# Patient Record
Sex: MALE | Race: WHITE
[De-identification: names, ages, dates, MRNs, and addresses within clinical notes are randomized per-mention and may not be internally consistent; named-entity substitution may affect disease eponyms.]

---

## 2018-07-30 ENCOUNTER — HOSPITAL ENCOUNTER (OUTPATIENT)
Dept: HOSPITAL 68 - ERH | Age: 38
Setting detail: OBSERVATION
LOS: 2 days | End: 2018-08-01
Attending: INTERNAL MEDICINE
Payer: COMMERCIAL

## 2018-07-30 VITALS — BODY MASS INDEX: 42.23 KG/M2 | HEIGHT: 70 IN | WEIGHT: 295 LBS

## 2018-07-30 DIAGNOSIS — F11.20: ICD-10-CM

## 2018-07-30 DIAGNOSIS — G47.33: ICD-10-CM

## 2018-07-30 DIAGNOSIS — I47.1: Primary | ICD-10-CM

## 2018-07-30 DIAGNOSIS — Z79.82: ICD-10-CM

## 2018-07-30 DIAGNOSIS — E66.01: ICD-10-CM

## 2018-07-30 LAB
ABSOLUTE GRANULOCYTE CT: 5 /CUMM (ref 1.4–6.5)
APTT BLD: 28 SEC (ref 25–37)
BASOPHILS # BLD: 0.2 /CUMM (ref 0–0.2)
BASOPHILS NFR BLD: 1.9 % (ref 0–2)
EOSINOPHIL # BLD: 0.3 /CUMM (ref 0–0.7)
EOSINOPHIL NFR BLD: 2.8 % (ref 0–5)
ERYTHROCYTE [DISTWIDTH] IN BLOOD BY AUTOMATED COUNT: 15.5 % (ref 11.5–14.5)
GRANULOCYTES NFR BLD: 55.4 % (ref 42.2–75.2)
HCT VFR BLD CALC: 40.6 % (ref 42–52)
LYMPHOCYTES # BLD: 3.2 /CUMM (ref 1.2–3.4)
MCH RBC QN AUTO: 27.6 PG (ref 27–31)
MCHC RBC AUTO-ENTMCNC: 33.3 G/DL (ref 33–37)
MCV RBC AUTO: 82.9 FL (ref 80–94)
MONOCYTES # BLD: 0.4 /CUMM (ref 0.1–0.6)
PLATELET # BLD: 343 /CUMM (ref 130–400)
PMV BLD AUTO: 8.3 FL (ref 7.4–10.4)
PROTHROMBIN TIME: 10.7 SEC (ref 9.4–12.5)
RED BLOOD CELL CT: 4.9 /CUMM (ref 4.7–6.1)
WBC # BLD AUTO: 9.1 /CUMM (ref 4.8–10.8)

## 2018-07-30 PROCEDURE — G0378 HOSPITAL OBSERVATION PER HR: HCPCS

## 2018-07-30 NOTE — RADIOLOGY REPORT
EXAMINATION:
XR CHEST
 
CLINICAL INFORMATION:
Chest pain
.
COMPARISON:
None
 
TECHNIQUE:
2 views of the chest were obtained.
 
FINDINGS:
No focal consolidation, pleural effusion or pneumothorax.
 
Heart size is normal.
 
No acute osseous abnormality.
 
IMPRESSION:
No acute cardiopulmonary process.

## 2018-07-31 ENCOUNTER — HOSPITAL ENCOUNTER (OUTPATIENT)
Dept: HOSPITAL 68 - ERH | Age: 38
Setting detail: OBSERVATION
LOS: 1 days | End: 2018-08-01
Attending: INTERNAL MEDICINE
Payer: COMMERCIAL

## 2018-07-31 VITALS — SYSTOLIC BLOOD PRESSURE: 112 MMHG | DIASTOLIC BLOOD PRESSURE: 64 MMHG

## 2018-07-31 VITALS — HEIGHT: 72 IN | WEIGHT: 295 LBS | BODY MASS INDEX: 39.96 KG/M2

## 2018-07-31 DIAGNOSIS — E66.01: ICD-10-CM

## 2018-07-31 DIAGNOSIS — G47.33: ICD-10-CM

## 2018-07-31 DIAGNOSIS — F11.21: ICD-10-CM

## 2018-07-31 DIAGNOSIS — I47.2: Primary | ICD-10-CM

## 2018-07-31 LAB
ABSOLUTE GRANULOCYTE CT: 4.4 /CUMM (ref 1.4–6.5)
ABSOLUTE GRANULOCYTE CT: 6 /CUMM (ref 1.4–6.5)
BASOPHILS # BLD: 0.1 /CUMM (ref 0–0.2)
BASOPHILS # BLD: 0.1 /CUMM (ref 0–0.2)
BASOPHILS NFR BLD: 0.6 % (ref 0–2)
BASOPHILS NFR BLD: 0.8 % (ref 0–2)
EOSINOPHIL # BLD: 0.2 /CUMM (ref 0–0.7)
EOSINOPHIL # BLD: 0.3 /CUMM (ref 0–0.7)
EOSINOPHIL NFR BLD: 2.6 % (ref 0–5)
EOSINOPHIL NFR BLD: 2.7 % (ref 0–5)
ERYTHROCYTE [DISTWIDTH] IN BLOOD BY AUTOMATED COUNT: 15.4 % (ref 11.5–14.5)
ERYTHROCYTE [DISTWIDTH] IN BLOOD BY AUTOMATED COUNT: 15.7 % (ref 11.5–14.5)
GRANULOCYTES NFR BLD: 52.8 % (ref 42.2–75.2)
GRANULOCYTES NFR BLD: 62.2 % (ref 42.2–75.2)
HCT VFR BLD CALC: 37.8 % (ref 42–52)
HCT VFR BLD CALC: 38.5 % (ref 42–52)
LYMPHOCYTES # BLD: 2.8 /CUMM (ref 1.2–3.4)
LYMPHOCYTES # BLD: 3.2 /CUMM (ref 1.2–3.4)
MCH RBC QN AUTO: 27.6 PG (ref 27–31)
MCH RBC QN AUTO: 27.7 PG (ref 27–31)
MCHC RBC AUTO-ENTMCNC: 33 G/DL (ref 33–37)
MCHC RBC AUTO-ENTMCNC: 33.3 G/DL (ref 33–37)
MCV RBC AUTO: 83.1 FL (ref 80–94)
MCV RBC AUTO: 83.8 FL (ref 80–94)
MONOCYTES # BLD: 0.4 /CUMM (ref 0.1–0.6)
MONOCYTES # BLD: 0.5 /CUMM (ref 0.1–0.6)
PLATELET # BLD: 311 /CUMM (ref 130–400)
PLATELET # BLD: 362 /CUMM (ref 130–400)
PMV BLD AUTO: 7.2 FL (ref 7.4–10.4)
PMV BLD AUTO: 7.5 FL (ref 7.4–10.4)
RED BLOOD CELL CT: 4.52 /CUMM (ref 4.7–6.1)
RED BLOOD CELL CT: 4.63 /CUMM (ref 4.7–6.1)
WBC # BLD AUTO: 8.4 /CUMM (ref 4.8–10.8)
WBC # BLD AUTO: 9.6 /CUMM (ref 4.8–10.8)

## 2018-07-31 PROCEDURE — G0378 HOSPITAL OBSERVATION PER HR: HCPCS

## 2018-07-31 NOTE — HISTORY & PHYSICAL
You RODRIGUEZDaniel 07/31/18 0213:
General Information and HPI
History of Present Illness:
37-year-old man with past medical history of opiate abuse on suboxone seen for 
evaluation of palpitations.
 
Patient reports over the past several months he has experienced brief (<10 
second) episodes of dizziness, cold sweats, shortness of breath, nausea, and 
palpitations. He saw his PCP recently about this whom ordered a Holter monitor 
that he wore last week (Thursday into Friday). Today he reported an episode that
last 30 minutes for which he came to the [place] ED for evaluation. Upon arrival
he was reportedly seen to be in a normal sinus rhythm but was given 5 mg of IV 
metoprolol with dramatic improve in his symptoms. He was apparently seen to be 
"in SVT" on telemetry per the ED provider. Currently he feels well and has no 
complaints
 
Review of Systems
He otherwise denies any headache, fever, chills, vision change, lightheadedness,
dizziness, chest pain, current palpitations, shortness of breath, nausea, 
vomiting, diarrhea, abdominal pain.
 
Objective
Vitals: Temp 96.8-97.5, HR , RR 17-18, -142/59-80
Physical Exam
-General: well developed, morbidly obese young  man in no acute 
distress
-HEENT: NCAT, PERRL, EOMI, anicteric sclera
-Neck: Supple, no JVD
-Cardio: Normal S1/S2 w/o m/g/r; RRR
-Pulmonary: CTA bilaterally
-Abdomen: Soft, NT, ND, BS+
-Neuro: Awake and alert, CN II-XII grossly intact
-Extremities: normal pulses, no edema
 
Labs / Imaging / Studies
-CBC: Wbc 9.1, Hgb 13.5, Hct 40.6, Plt 343
-BMP: Na 139, K 4.1, Cl 102, CO2 25, BUN 19, Cr 0.7, AG 12, Glu 95
-LFT: AST 73, ALT 87
-Misc: Mg 1.8, DD unremarkable, troponin I <0.01, TSH 2.73, T4 1.35, INR 0.98
-UTox: negative
-EKG: Sinus tachycardia
-CXR: No acute cardiopulmonary process.
 
Assessment
37 year old man seen for evaluation of palpitations. Patient currently feels 
well and has no complaints. Vitals are significant for elevated heart rate on 
telemetry up to 150s per ED staff. Physical examination includes a normal 
cardiopulmonary, abdominal, and neurologic exam. Labs including CBC, BMP, 
troponin, D-dimer, TFTs, and utox are within normal limits or negative. EKG is 
sinus tachycardia.
 
Clinically patient appears to have had some episode of palpitations with 
reported telemetry evidence of an SVT; this however is not available for review 
as the data was "purged". His recent holter mnitor results have no yet been 
reviewed. Patient is being placed under observation on the telemetry floor for 
telemetry monitoring, rate control, echocardiogram, and cardiology consultation.
 
Problem List
-Symptomatic palpitations, possible underlying SVT
-Morbid Obesity
-History of opiate abuse, on suboxone
 
Plan
-Place under observation on telemetry
-Telemetry monitoring
-Start Metoprolol 25 mg PO BID
-Continue suboxone
-Consult with cardiology for palpitations
-Transthoracic echocardiogram
-Pain control with acetaminophen
-Regular diet
-DVT PPx with lovenox
-FULL CODE
 
Allergies/Medications
Allergies:
Coded Allergies:
No Known Allergies (07/30/18)
 
 
Past History
 
Travel History
Traveled to [place] past 21 day No
 
Surgical History
Surgical History: non-contributory
 
Review of Systems
 
Review of Systems
Constitutional:
Reports: see HPI. 
 
Exam & Diagnostic Data
Last 24 Hrs of Vital Signs/I&O
 Vital Signs
 
 
Date Time Temp Pulse Resp B/P B/P Pulse O2 O2 Flow FiO2
 
     Mean Ox Delivery Rate 
 
07/31 0847 98.2 87 18 112/64     
 
07/31 0846 98.2 87 18 112/64  98   
 
07/31 0735 97.4 60 18 110/68  96 Room Air  
 
07/31 0612 96.4 60 16 105/61  93 Room Air  
 
07/30 2300 97.5 64 18 154/80  95 Room Air  
 
07/30 2104 97.2 71  113/70     
 
07/30 2024  72 17 100/59  95 Room Air  
 
07/30 2013  92       
 
07/30 2005 96.8        
 
07/30 1954  100 18 142/74  97 Room Air  
 
 
 Intake & Output
 
 
 07/31 1600 07/31 0800 07/31 0000
 
Intake Total   
 
Output Total   
 
Balance   
 
    
 
Patient   133.81 kg
 
Weight   
 
Weight   Reported by Patient
 
Measurement   
 
Method   
 
 
 
 
Assessment/Plan
 
As Ranked By This Provider
Problem List:
 1. SVT (supraventricular tachycardia)
 
 
Core Measures/Misc (9/17)
 
Acute Coronary Syndrome
ACS Diagnosis: No
 
Congestive Heart Failure
Congestive Heart Failure Diagnosis No
 
Cerebrovascular Accident
CVA/TIA Diagnosis: No
 
VTE (View Protocol)
VTE Risk Factors No risk factors
No Mechanical VTE Prophylaxis d/t N/A MechProphylax Ordered
No VTE Pharm Prophylaxis d/t NA PharmProphylax ordered
 
Sepsis (View protocol)
Sepsis Present: No
If YES complete Sepsis Event Note If YES complete Sepsis Event Note
 
Laura Zhu MD 07/31/18 2100:
General Information and HPI
 
Allergies/Medications
Home Med list
Aspirin (Ecotrin*) 81 MG TABLET.DR   1 TAB PO DAILY chest pain
Buprenorphine HCl/Naloxone HCl (Suboxone 8 MG-2 MG Sl Film) 8 MG-2 MG FILM   1 
STR SL BID OPIATE DEPENDENCE  (Reported)
Metoprolol Tartrate (Lopressor) 50 MG TABLET   0.5 TAB PO BID svt
 
 
 
Core Measures/Misc (9/17)
 
Sepsis (View protocol)
If YES complete Sepsis Event Note If YES complete Sepsis Event Note
 
Attending MD Review Statement
 
Attending Statement
Attending MD Statement: examined this patient, discuss w/resident/PA/NP, agreed 
w/resident/PA/NP
Attending Assessment/Plan:
This is a 37-year-old male admitted to the hospital for evaluation of dizziness 
and palpitations incidentally found to have SVT while in the emergency 
department.  He spontaneously broke out of the rhythm and was started on 
metoprolol 25 mg p.o. twice daily.  Patient will be seen by cardiology in the 
morning.  We will obtain an echocardiogram and he will need outpatient follow-up
with EP.

## 2018-07-31 NOTE — PATIENT DISCHARGE INSTRUCTIONS
Discharge Instructions
 
General Discharge Information
You were seen/treated for:
SVT
DIZZINESS
Special Instructions:
F/U PCP IN ONE WEEK AFTER DISCHARGE
 
F/U CARDIOLOGIST IN ONE WEEK AFTER DISCHARGE
 
Diet
Continue normal diet: Yes
 
Activity
Full Activity/No Limits: Yes
 
Acute Coronary Syndrome
 
Inclusion Criteria
At DC or during hospital stay patient has or had the following:
ACS DIAGNOSIS No
 
Discharge Core Measures
Meds if any: Prescribed or Continued at Discharge
Meds if any: NOT Prescribed or Continued at Discharge
 
Congestive Heart Failure
 
Inclusion Criteria
At DC or during hospital stay patient has or had the following:
CHF DIAGNOSIS No
 
Discharge Core Measures
Meds if any: Prescribed or Continued at Discharge
Meds if any: NOT Prescribed or Continued at Discharge
 
Cerebrovascular accident
 
Inclusion Criteria
At DC or during hospital stay patient has or had the following:
CVA/TIA Diagnosis No
 
Discharge Core Measures
Meds if any: Prescribed or Continued at Discharge
Meds if any: NOT Prescribed or Continued at Discharge
 
Venous thromboembolism
 
Inclusion Criteria
VTE Diagnosis No
VTE Type NONE
VTE Confirmed by (Test) NONE
 
Discharge Core Measures
- Per Current guidelines, there needs to be overlap
- treatment for the first 5 days of Warfarin therapy.
- If discharged on Warfarin prior to 5 days of
- overlap therapy, the patient will need to be
- assessed for post discharge needs including
- *Post discharge parental anticoagulation
- *Warfarin and/or parental anticoagulation education
- *Follow up date to check INR post discharge
At least 5 days overlap therapy as Inpatient No
Meds if any: Prescribed or Continued at Discharge
Note: Overlap Therapy is Warfarin and Anticoagulant
Meds if any: NOT Prescribed or Continued at Discharge

## 2018-07-31 NOTE — PN- ATT ADDEND
Attending Addendum
Attending Brief Note
Patient seen and examined.  37-year-old male past medical history of chronic 
opiate dependence on Suboxone who is here with symptomatic SVT.  His SVT broke 
and patient has been in normal sinus rhythm since then.  Patient has clearly had
this episode before and had a recent Holter monitor done with the [facility] 
cardiologist.  For now we will watch him off medication.  I spoke to Dr. Barcenas
who will come and see him and likely start him on a calcium channel blocker.  If
cleared by Dr. Barcenas, I anticipate discharge later today with close outpatient
follow-up.

## 2018-07-31 NOTE — EVENT NOTE
Event Note
Event Note:
The patient was seen this morning.  He is doing better.  Patient gives a history
of relief of symptoms post metoprolol 25 mg treatment and aspirin 81 mg. amd 
follow up with Dr Barcenas out patient for recurrent symptoms

## 2018-07-31 NOTE — CONS- CARDIOLOGY
General Information and HPI
 
Consulting Request
Date of Consult: 18
Requested By:
Twyla RODRIGUEZ,Karon CABRERA
 
History of Present Illness:
Mr. Rangel is a 37 year old male with history of obesity, obstuctive sleep apnea
and drug dependence. Over the past year he has noted intermittent palpitations 
that typically last for just seconds at a time. Over the past couple months 
these palpitations have become more frequent and typically happen a couple times
per day. Due to a prolonged episode today, this patient opted to be evaluated in
the ER. He was reportedly tachycardic but no recording was obtained. The 
palpitations are described as a rapid heart beat that does not cause any neck 
pulsation but is associated with lightheadedness and shortness of breath and on 
very rare occasions nausea. He also notes a chest tightness in association with 
any significant episode. At his baseline, he can exercise at a gym without 
symptoms. In the ER, this patient was started on Metoprolol and he currently 
feels back to normal. He is noted to have mildly increased hepatic transaminases
but otherwise has normal thyroid function tests. It should be noted that this 
patient had a recent Holter monitor done with results pending.
 
Allergies/Medications
Allergies:
Coded Allergies:
No Known Allergies (18)
 
Home Med List:
Buprenorphine HCl/Naloxone HCl (Suboxone 8 MG-2 MG Sl Film) 8 MG-2 MG FILM   1 
STR SL BID OPIATE DEPENDENCE  (Reported)
 
 
Review of Systems
Review of Systems:
A twelve point review of systems is unremarkable.
 
Past History
 
Travel History
Traveled to [place] past 21 day No
 
Medical History
EENT: NONE
Respiratory: obstructive sleep apnea (on CPAP)
Gastrointestinal: NONE
Hepatic: NONE
Renal: NONE
Musculoskeletal: NONE
Psychiatric: NONE
Endocrine: NONE
Blood Disorders: NONE
Cancer(s): NONE
GYN/Reproductive: NONE
 
Surgical History
Surgical History: right knee surgery x 2
 
Family History
Relations & Conditions If Any:
FATHER ( young of unclear cause).
 
 
Psychosocial History
Where Do You Live? Home
Services at Home: None
Smoking Status: Unknown If Ever Smoked
 
Exam & Diagnostic Data
Vital Signs and I&O
Vital Signs
 
 
Date Time Temp Pulse Resp B/P B/P Pulse O2 O2 Flow FiO2
 
     Mean Ox Delivery Rate 
 
 1058 98.2 52 18 112/64     
 
 0847 98.2 87 18 112/64     
 
 0846 98.2 87 18 112/64  98   
 
 0735 97.4 60 18 110/68  96 Room Air  
 
 0612 96.4 60 16 105/61  93 Room Air  
 
 2300 97.5 64 18 154/80  95 Room Air  
 
 2104 97.2 71  113/70     
 
 2024  72 17 100/59  95 Room Air  
 
 2013  92       
 
 2005 96.8        
 
 1954  100 18 142/74  97 Room Air  
 
 
 Intake & Output
 
 
  08 0000  08 0000
 
Intake Total 200     
 
Output Total      
 
Balance 200     
 
       
 
Intake, Oral 200     
 
Patient 295 lb  295 lb   
 
Weight      
 
Weight   Reported by Patient   
 
Measurement      
 
Method      
 
 
 
Physical Exam:
General: WD/obese male in NAD; alert and oriented x 3
HEENT: NC/AT, PERRL, EOMI
Neck: no JVD, no carotid bruit
Heart: RRR w/o murmur
Lungs: clear bilaterally
Abdomen: soft, obese, NT, +ve bowel sounds
Extremities: no edema
 
Assessment/Plan
Assessment/Plan
* This patient has recurrent episodes of palpitations with increased heart rate.
In the absence of an ECG or telemetry strip it is difficult to know if this is 
atrial fibrillation, flutter with 2:1 block or any of a number of other 
supraventricular dysrhythmias. Of some concern are his symptoms of chest 
discomfort in association with the palpitations. I do not think this patient is 
having unstable angina and he has ruled out for an MI and therefore additional 
evaluation may be done as an outpatient.
* We will plan on obtaining his Holter monitor results and will arrange for this
patient to have an outpatient stress test to assess for myocardial ischemia. We 
will also obtain an echocardiogram.
* For now, I would continue Metoprolol at 25mg BID. It is now clear that this is
an SVT verses atrial fib/flutter and rapid heart rates may be giving him 
ischemic symptoms. As such, a beta blocker is reasonable for now. Begin an 
aspirin at 81mg daily.
 
 
Consult Acknowledgment
- Thank you for your consult request.

## 2018-07-31 NOTE — ED GENERAL ADULT
History of Present Illness
 
General
Chief Complaint: General Adult
Stated Complaint: SIB PCP FOR RE-ADMISSION
Source: patient
Exam Limitations: no limitations
 
Vital Signs & Intake/Output
Vital Signs & Intake/Output
 Vital Signs
 
 
Date Time Temp Pulse Resp B/P B/P Pulse O2 O2 Flow FiO2
 
     Mean Ox Delivery Rate 
 
 2249 97.7 65 20 95/50  94 Room Air  
 
 2130 97.8 58 20 102/54  94 Room Air  
 
 1928 98.1 59 18 103/67  95 Room Air  
 
 1748 97.5 58 22 99/56  94 Room Air  
 
 
 ED Intake and Output
 
 
  0000  1200
 
Intake Total 0 
 
Output Total  
 
Balance 0 
 
   
 
Intake, Oral 0 
 
Patient 295 lb 
 
Weight  
 
 
 
Allergies
Coded Allergies:
No Known Allergies (18)
 
Reconcile Medications
Aspirin (Ecotrin*) 81 MG TABLET.DR   1 TAB PO DAILY chest pain
Buprenorphine HCl/Naloxone HCl (Suboxone 8 MG-2 MG Sl Film) 8 MG-2 MG FILM   1 
STR SL BID OPIATE DEPENDENCE  (Reported)
Metoprolol Tartrate (Lopressor) 50 MG TABLET   0.5 TAB PO BID svt
 
Triage Note:
PER PT SEEN LAST NIGHT FOR SVT, WAS SUPPOSED TO
STAY BUT HAD A HOLTER MONITOR AND IT SHOWED MORE
TOLD TO RETURN FOR ADMISSION
Triage Nurses Notes Reviewed? yes
Onset: Gradual
Duration: day(s):
Timing: intermittent
HPI:
38 y/o male with a h/o GIOVANNY presenting with intermittent episodes of palpitations
, SOB, nausea, diaphoresis x1 month. Reports the episodes will last a few 
seconds, usually ~10 secs, but have been increasing in frequency, now occuring 5
-6 times per day. Was seen by his PMD last week and placed on Holter monitor and
has been awaiting the results. States last night had an episode that lasted ~40 
mins and presented to the ED. during his emergency department stay he was noted 
to have an episode of SVT that had self resolved.  Was discharged ~2 hours ago 
with ASA and metoprolol. Was called by his PMD ~1 hr ago to return to the ED for
admission as the results of his Holter monitor had been showing sustained runs 
of V. tach.  On arrival to the emergency Department patient is currently 
asymptomatic and experiencing no chest pain or palpitations.
(Kirstie WARE,Clara)
 
Past History
 
Travel History
Traveled to [place] past 21 day No
 
Medical History
Any Pertinent Medical History? see below for history
Neurological: NONE
EENT: NONE
Respiratory: obstructive sleep apnea (on CPAP)
Gastrointestinal: NONE
Hepatic: NONE
Renal: NONE
Musculoskeletal: NONE
Psychiatric: NONE
Endocrine: NONE
Blood Disorders: NONE
Cancer(s): NONE
GYN/Reproductive: NONE
 
Surgical History
Surgical History: right knee surgery x 2
 
Psychosocial History
Who do you live with Spouse
Services at Home None
What is your primary language English
Tobacco Use: Current Not Daily
Daily Tobacco Use Amount/Type: =< 4 Cigarettes daily
 
Family History
Family History, If Any:
FATHER ( young of unclear cause).
 
Hx Contributory? No
(Clara Wadsworth)
 
Review of Systems
 
Review of Systems
Constitutional:
Reports: no symptoms. 
EENTM:
Reports: no symptoms. 
Respiratory:
Reports: see HPI. 
Cardiovascular:
Reports: see HPI. 
GI:
Reports: no symptoms. 
Genitourinary:
Reports: no symptoms. 
Musculoskeletal:
Reports: no symptoms. 
Skin:
Reports: no symptoms. 
Neurological/Psychological:
Reports: see HPI. 
Hematologic/Endocrine:
Reports: no symptoms. 
Immunologic/Allergic:
Reports: no symptoms. 
All Other Systems: Reviewed and Negative
(Clara Wadsworth)
 
Physical Exam
 
Physical Exam
General Appearance: well developed/nourished, no apparent distress, alert, awake
, comfortable
Head: atraumatic, normal appearance
Eyes:
Bilateral: normal appearance. 
Neck: normal inspection
Respiratory: normal breath sounds, lungs clear
Cardiovascular: regular rate/rhythm
Gastrointestinal: soft, non-tender
Back: normal inspection
Extremities: normal inspection
Neurologic/Psych: awake, alert, oriented x 3, normal gait, normal mood/affect
Skin: intact, normal color, warm/dry
 
Core Measures
ACS in differential dx? No
CVA/TIA Diagnosis: No
Sepsis Present: No
Sepsis Focused Exam Completed? No
(Clara Wadsworth)
 
Progress
Differential Diagnoses
I considered the following diagnoses in my evaluation of the patient: [Cardiac 
arrhythmia versus electrolyte derangement versus ACS versus thyroid imbalance]
 
Plan of Care:
 Orders
 
 
Procedure Date/time Status
 
Regular Diet  B Active
 
Patient Data 2327 Active
 
Place in observation 2325 Active
 
ED Holding Orders 2325 Active
 
Vital Signs 2325 Active
 
Code Status 2325 Active
 
THYROID STIMULATING HORMONE  174 Complete
 
TROPONIN LEVEL  174 Complete
 
MAGNESIUM  1746 Complete
 
FREE T4  174 Complete
 
COMPREHENSIVE METABOLIC PANEL  174 Complete
 
CBC WITHOUT DIFFERENTIAL 1746 Complete
 
EKG 1746 Active
 
 
 Laboratory Tests
 
 
 
18 1753:
Anion Gap 13, Estimated GFR > 60, BUN/Creatinine Ratio 22.5, Glucose 107  H, 
Calcium 9.3, Magnesium 1.9, Total Bilirubin 0.3, AST 42, ALT 77  H, Alkaline 
Phosphatase 79, Troponin I < 0.01, Total Protein 7.0, Albumin 4.0, Globulin 3.0,
Albumin/Globulin Ratio 1.3, TSH 0.638, Free T4 1.11, CBC w Diff NO MAN DIFF REQ,
RBC 4.63  L, MCV 83.1, MCH 27.7, MCHC 33.3, RDW 15.4  H, MPV 7.5, Gran % 62.2, 
Lymphocytes % 29.0, Monocytes % 5.5, Eosinophils % 2.7, Basophils % 0.6, 
Absolute Granulocytes 6.0, Absolute Lymphocytes 2.8, Absolute Monocytes 0.5, 
Absolute Eosinophils 0.3, Absolute Basophils 0.1
 
EKG shows normal sinus rhythm, no cardiac arrhythmias
Labs unremarkable
Discussed with Dr. Barcenas and will place in observation to telemetry monitoring
Discussed with EDMD
Initial ED EKG: NSR, no ST T wave changes
(Clara Wadsworth)
 
Departure
 
Departure
Disposition: STILL A PATIENT
Condition: Stable
Clinical Impression
Primary Impression: Palpitations
Secondary Impressions: Cardiac arrhythmia
Referrals:
Brant Benavides MD, III (PCP/Family)
 
Departure Forms:
Customer Survey
General Discharge Information
 
Observation Note
Place Patient In: Non-ED OBS Care Area
Rationale for Observation:
My rational for observation is as follows [serial EKGs, serial troponins, 
telemetry monitoring, hemodynamic monitoring, cardiology consult].
 
(Clara Wadsworth)
 
Observation Note
Spoke With:
Audra RODRIGUEZ,Laura
Physician Advisor Notified: KAUSHAL ANDERSON DO
 
PA/NP Co-Sign Statement
Statement:
ED Attending supervision documentation-
 
[X] I saw and evaluated the patient. I have also reviewed all the pertinent lab 
results and diagnostic results. I agree with the findings and the plan of care 
as documented in the PA's/NP's documentation. 
 
[] I have reviewed the ED Record and agree with the PA's/NP's documentation.
 
[] Additions or exceptions (if any) to the PAs/NP's note and plan are 
summarized below:
[]
 
Patient in no acute distress on my exam.  He is been having runs of V. tach, and
concern was raised for progression to V. fib.
(Kaushal Anderson DO)
 
Critical Care Note
 
Critical Care Note
Critical Care Time: non-applicable
(Clara Wadsworth)

## 2018-08-01 VITALS — SYSTOLIC BLOOD PRESSURE: 98 MMHG | DIASTOLIC BLOOD PRESSURE: 62 MMHG

## 2018-08-01 VITALS — SYSTOLIC BLOOD PRESSURE: 102 MMHG | DIASTOLIC BLOOD PRESSURE: 64 MMHG

## 2018-08-01 VITALS — DIASTOLIC BLOOD PRESSURE: 62 MMHG | SYSTOLIC BLOOD PRESSURE: 106 MMHG

## 2018-08-01 LAB
ABSOLUTE GRANULOCYTE CT: 4.3 /CUMM (ref 1.4–6.5)
BASOPHILS # BLD: 0 /CUMM (ref 0–0.2)
BASOPHILS NFR BLD: 0.5 % (ref 0–2)
EOSINOPHIL # BLD: 0.3 /CUMM (ref 0–0.7)
EOSINOPHIL NFR BLD: 3.2 % (ref 0–5)
ERYTHROCYTE [DISTWIDTH] IN BLOOD BY AUTOMATED COUNT: 15.6 % (ref 11.5–14.5)
GRANULOCYTES NFR BLD: 52.7 % (ref 42.2–75.2)
HCT VFR BLD CALC: 37.1 % (ref 42–52)
LYMPHOCYTES # BLD: 3.2 /CUMM (ref 1.2–3.4)
MCH RBC QN AUTO: 27.6 PG (ref 27–31)
MCHC RBC AUTO-ENTMCNC: 32.8 G/DL (ref 33–37)
MCV RBC AUTO: 84.1 FL (ref 80–94)
MONOCYTES # BLD: 0.4 /CUMM (ref 0.1–0.6)
PLATELET # BLD: 317 /CUMM (ref 130–400)
PMV BLD AUTO: 7.7 FL (ref 7.4–10.4)
RED BLOOD CELL CT: 4.41 /CUMM (ref 4.7–6.1)
WBC # BLD AUTO: 8.2 /CUMM (ref 4.8–10.8)

## 2018-08-01 NOTE — PN-OBSERVATION
Observation Note
 
Observation Note
_
I have personally examined IRMA GILMORE. him disposition is uncertain at this 
time. Before a determination can be made, he requires continued observation for 
the following reasons [].
 
 
Assessment/Plan Medical
Assessment:
 Mr Gilmore is a 37 M patient on suboxone therapy for past 9 years, came to ER 
with a h/o racing of heart and uneasiness on July 31, was suspected of SVT , and
was observed and discharged on metoprolol 25mg bid.. His previous Holter monitor
showed presence of ventricular tachycardia, hence he was brought back to the ER 
and was admitted in telemetry for observation.
 
Problem List:
 1. Ventricular tachycardia
   Assessment/Plan
Patient is a 37 M  with h/o suboxone therapy  , was suspected of SVT, but the 
previous holter monitor showed 2 short runs of non sustained Ventricular 
tachycardia  and frequent PVCs. Thus  patient returned for observation
- patient was already on metoprolol therapy.
- cardiology was consulted , and they suspected possibility of suboxone as a 
causative since it can contribute to changes such as prolonged QT interval 
tachycardia and bradycardia 
- Echocardiogram showed no ventricular dysfunction with normal EF
- Patient remained asymptomatic throughout observation and telemetry did not 
show any significant changes
- Patient was advised to continue metoprolol 25mg bid and follow up outpatient 
with cardiology for nuclear stress testing
 
 
 
Plan:
discharged with metoprolol 25mf bid and outpatient follow up for nuclear stress 
testing
DVT/Prophylaxis: early ambulation low risk
 
Subjective
Follow-up For:
Arrhythmia
Complaints: no complaints
Subjective:
Patient returned to ER for Holeter monitor report. but was already on metorpolol
and asymptomatic
 
Review of Systems
Constitutional:
Reports: see HPI. 
 
Objective
Last 24 Hrs of Vital Signs/I&O
 Vital Signs
 
 
Date Time Temp Pulse Resp B/P B/P Pulse O2 O2 Flow FiO2
 
     Mean Ox Delivery Rate 
 
08/01 0803  59  98/62     
 
08/01 0640 97.6 54 18 102/64  97 Room Air  
 
08/01 0203 97.8 56 18 106/62  96 Room Air  
 
07/31 2249 97.7 65 20 95/50  94 Room Air  
 
07/31 2130 97.8 58 20 102/54  94 Room Air  
 
 
 Intake & Output
 
 
 08/01 1600 08/01 0800 08/01 0000
 
Intake Total   0
 
Output Total   
 
Balance   0
 
    
 
Intake, Oral   0
 
Patient  295 lb 295 lb
 
Weight   
 
 
 
 
Physical Exam
General Appearance: Alert (normal)
Cardiovascular: Normal S1, Normal S2
Lungs: Clear to Auscultation
Abdomen: Soft, No Tenderness, No Hepatospenomegaly
Neurological: Normal Gait (normal)
Extremities: No Clubbing (normal)
Current Medications:
 Current Medications
 
 
  Sig/Namrata Start time  Last
 
Medication Dose Route Stop Time Status Admin
 
Acetaminophen 650 MG Q6P PRN 08/01 0445 DCD 
 
  PO   
 
Aspirin Buffered 81 MG DAILY 08/01 0900 DCD 08/01
 
  PO   0803
 
Buprenorphine/ 1 EACH BID 08/01 0900 DCD 08/01
 
Naloxone     0858
 
Buprenorphine/ 1 TAB .STK-MED ONE 08/01 0801 DC 
 
Naloxone  SL 08/01 0802  
 
Buprenorphine/ 1 TAB .STK-MED ONE 08/01 0800 DC 
 
Naloxone  SL 08/01 0801  
 
Enoxaparin Sodium 40 MG DAILY 08/01 0900 DCD 08/01
 
  SC   0803
 
Metoprolol Tartrate 25 MG BID 08/01 0900 DCD 08/01
 
  PO   0803
 
 
 
Last 24 Hrs of Labs/Mics:
 Laboratory Tests
 
08/01/18 0607:
Anion Gap 9, Estimated GFR > 60, BUN/Creatinine Ratio 22.9, Magnesium 1.9, CBC w
Diff NO MAN DIFF REQ, RBC 4.41  L, MCV 84.1, MCH 27.6, MCHC 32.8  L, RDW 15.6  H
, MPV 7.7, Gran % 52.7, Lymphocytes % 38.3, Monocytes % 5.3, Eosinophils % 3.2, 
Basophils % 0.5, Absolute Granulocytes 4.3, Absolute Lymphocytes 3.2, Absolute 
Monocytes 0.4, Absolute Eosinophils 0.3, Absolute Basophils 0

## 2018-08-01 NOTE — PATIENT DISCHARGE INSTRUCTIONS
Discharge Instructions
 
General Discharge Information
You were seen/treated for:
VENTRICULAR TACHYCARDIA
Watch for these problems:
Chest pain, palpitation, shortness of breath, sweating
Special Instructions:
Follow-up with cardiology Dr. Barcenas within 1 week of discharge
 
Diet
Continue normal diet: Yes
 
Acute Coronary Syndrome
 
Inclusion Criteria
At DC or during hospital stay patient has or had the following:
ACS DIAGNOSIS No
 
Discharge Core Measures
Meds if any: Prescribed or Continued at Discharge
Meds if any: NOT Prescribed or Continued at Discharge
 
Congestive Heart Failure
 
Inclusion Criteria
At DC or during hospital stay patient has or had the following:
CHF DIAGNOSIS No
 
Discharge Core Measures
Meds if any: Prescribed or Continued at Discharge
Meds if any: NOT Prescribed or Continued at Discharge
 
Cerebrovascular accident
 
Inclusion Criteria
At DC or during hospital stay patient has or had the following:
CVA/TIA Diagnosis No
 
Discharge Core Measures
Meds if any: Prescribed or Continued at Discharge
Meds if any: NOT Prescribed or Continued at Discharge
 
Venous thromboembolism
 
Inclusion Criteria
VTE Diagnosis No
VTE Type NONE
VTE Confirmed by (Test) NONE
 
Discharge Core Measures
- Per Current guidelines, there needs to be overlap
- treatment for the first 5 days of Warfarin therapy.
- If discharged on Warfarin prior to 5 days of
- overlap therapy, the patient will need to be
- assessed for post discharge needs including
- *Post discharge parental anticoagulation
- *Warfarin and/or parental anticoagulation education
- *Follow up date to check INR post discharge
At least 5 days overlap therapy as Inpatient No
Meds if any: Prescribed or Continued at Discharge
Note: Overlap Therapy is Warfarin and Anticoagulant
Meds if any: NOT Prescribed or Continued at Discharge

## 2018-08-01 NOTE — CONS- CARDIOLOGY
General Information and HPI
 
Consulting Request
Date of Consult: 18
Requested By:
Twyla RODRIGUEZ,Karon CABRERA
 
History of Present Illness:
Mr. Rangel is a 37 year old male with history of obesity, obstuctive sleep apnea
and drug dependence. Over the past year he has noted intermittent palpitations 
that typically last for just seconds at a time. Over the past couple months 
these palpitations have become more frequent and typically happen a couple times
per day. Due to a prolonged episode yesterday, this patient opted to be 
evaluated in the ER. He was reportedly tachycardic but no recording was 
obtained. The palpitations are described as a rapid heart beat that does not 
cause any neck pulsation but is associated with lightheadedness and shortness of
breath and on very rare occasions nausea. He also notes a chest tightness in 
association with any significant episode. At his baseline, he can exercise at a 
gym without symptoms. In the ER, this patient was started on Metoprolol and he 
currently feels back to normal. He is noted to have mildly increased hepatic 
transaminases but otherwise has normal thyroid function tests.
 
The patient was discharged to home yesterday. I was called yesterday afternoon 
by Dr. Benavides, his primary care physician who had just received his Holter 
monitor results which showed over 4 thousand PVC's including a 6 beat and 4 beat
run of NSVT. His echocardiogram showed a normal EF and I was sent a sleep study 
that showed severe sleep apnea. Dr. Mesa instructed Dean to return to the ER 
for further evaluation. He is asymptomatic at this time.
 
Allergies/Medications
Allergies:
Coded Allergies:
No Known Allergies (18)
 
Home Med List:
Aspirin (Ecotrin*) 81 MG TABLET.DR   1 TAB PO DAILY chest pain
Buprenorphine HCl/Naloxone HCl (Suboxone 8 MG-2 MG Sl Film) 8 MG-2 MG FILM   1 
STR SL BID OPIATE DEPENDENCE  (Reported)
Metoprolol Tartrate 25 MG TABLET   1 TAB PO BID HEART
 
 
Review of Systems
Review of Systems:
A review of systems is unremarkable.
 
Past History
 
Travel History
Traveled to [place] past 21 day No
 
Medical History
EENT: NONE
Respiratory: obstructive sleep apnea (on CPAP)
Gastrointestinal: NONE
Hepatic: NONE
Renal: NONE
Musculoskeletal: NONE
Psychiatric: NONE
Endocrine: NONE
Blood Disorders: NONE
Cancer(s): NONE
GYN/Reproductive: NONE
 
Surgical History
Surgical History: right knee surgery x 2
 
Family History
Relations & Conditions If Any:
FATHER ( young of unclear cause).
 
 
Psychosocial History
Where Do You Live? Home
Services at Home: None
Smoking Status: Unknown If Ever Smoked
 
Exam & Diagnostic Data
Vital Signs and I&O
 Intake & Output
 
 
  1600  08 0000  1600  08 0000
 
Intake Total    200  
 
Output Total      
 
Balance    200  
 
       
 
Intake, Oral    200  
 
Patient    295 lb  295 lb
 
Weight      
 
Weight      Reported by Patient
 
Measurement      
 
Method      
 
 
 
Physical Exam:
General: WD/obese male in NAD; alert and oriented x 3
HEENT: NC/AT, PERRL, EOMI
Neck: no JVD, no carotid bruit
Heart: RRR w/o murmur
Lungs: clear bilaterally
Abdomen: soft, obese, NT, +ve bowel sounds
Extremities: no edema
 
Assessment/Plan
Assessment/Plan
* This patient has recurrent episodes of palpitations with increased heart rate.
His Holter monitor shows frequent PVC's including two short runs of asymptomatic
NSVT. This is in the setting of a normal EF. It is possible that suboxone is 
contributing to this since it is associated with prolonged QT interval 
tachycardia and bradycardia. This drug should be stopped. A cardiac MRI is also 
recommended which can be done as an outpatient. I do not think this patient is 
having angina or unstable angina and he has ruled out for an MI and therefore 
additional evaluation may be done as an outpatient.
* For now, I would continue Metoprolol at 25mg BID and an aspirin at 81mg daily.
 
 
 
 
Consult Acknowledgment
- Thank you for your consult request.

## 2018-08-01 NOTE — HISTORY & PHYSICAL
Daniel Orta MD 18 0035:
General Information and HPI
History of Present Illness:
37 year old man with past medical history of obesity, palpitations, opiate 
dependence on suboxone, and GIOVANNY on CPAP sent in by his PCP for findings of VT on
recent holter monitor.
 
Patient was admited yesterday after experiencing a 30 minute long episode of 
palpitations for which he was observed on telemetry and was found to have brief 
episodes of SVT per the ED staff. Patient was seen by cardiology and started on 
aspirin and metoprolol. An echocardiogram was performed with results pending and
patient was discharged to home with instruction to follow up as an outpatient.
 
Patient was contacted by his PCP after they received the holter monitor results 
that was done last week which reportedly revealed episodes of ventricular 
tachycardia and was instructed to return to the hospital.  Patient currently 
feels well and has no complaints.
 
Review of systems
He otherwise denies any headache, fever, chills, blurred/double vision, 
lightheadedness/dizziness, chest pain, palpitations, heartburn, shortness of 
breath, cough, nausea, vomiting, diarrhea, constipation, urinary complaints.
 
Objective
Vitals: Temp 97.5-98.1, HR 58-65, RR 18-22, BP /50-67, SPO2 94-95% on room
air
Physical Exam
-General: well developed, morbidly obese young  man in no acute 
distress
-HEENT: NCAT, PERRL, EOMI, anicteric sclera
-Neck: Supple, no JVD
-Cardio: Normal S1/S2 w/o m/g/r; RRR
-Pulmonary: CTA bilaterally
-Abdomen: Soft, NT, ND, BS+
-Neuro: Awake and alert, CN II-XII grossly intact
-Extremities: normal pulses, no edema
 
Labs / Imaging / Studies
-CBC: WBC 9.6, hemoglobin 12.8, hematocrit 30.5, platelet 362
-BMP: , K4.5, , CO2 26, BUN 18, creatinine 0.8, Hgb 13, glucose 107
-LFT: Within normal limits
-Misc: Mg 1.9, TSH 0.638, T4 1.11
-EKG: Normal sinus rhythm
 
Assessment
37-year-old morbidly obese man with history of GIOVANNY readmitted for findings of 
ventricular tachycardia on recent Holter monitor and previous episodes of 
symptomatic palpitations.
 
Presently patient feels well and has no complaints.  Vital signs are within 
normal limits.  Physical examination is unremarkable.  Labs including CBC, BMP, 
LFT, magnesium, TFTs are within normal limits or negative.  EKG demonstrates 
normal sinus rhythm.
 
Clinically patient appears to have an underlying predisposition for palpitations
with telemetry evidence of SVT and Holter monitor evidence of ventricular 
tachycardia.  An echocardiogram was obtained with results pending to assess for 
structural heart disease.  Patient was begun on a beta-blocker and is tolerating
it well.  Patient may require an EP evaluation and study to assess for the 
etiology of his arrhythmias.  Patient is being placed under observation on the 
telemetry floor for further telemetry monitoring, cardiology and 
electrophysiology evaluations.
 
Problem List
-Symptomatic palpitations, ventricular tachycardia seen on Holter monitor
-history of opiate dependence, on Suboxone
-Obstructive sleep apnea, on CPAP
-Morbid obesity
 
Plan
-Place under observation on telemetry floor
-Telemetry monitoring
-Continue home meds: Metoprolol, aspirin, and Suboxone
-Consult with cardiology for ventricular tachycardia
-Follow-up transthoracic echocardiogram
-Pain control with acetaminophen
-Regular diet
-DVT prophylaxis with Lovenox
-Full code
 
Allergies/Medications
Allergies:
Coded Allergies:
No Known Allergies (18)
 
Home Med list
Aspirin (Ecotrin*) 81 MG TABLET.DR   1 TAB PO DAILY chest pain
Buprenorphine HCl/Naloxone HCl (Suboxone 8 MG-2 MG Sl Film) 8 MG-2 MG FILM   1 
STR SL BID OPIATE DEPENDENCE  (Reported)
Metoprolol Tartrate 25 MG TABLET   1 TAB PO BID HEART
 
 
Past History
 
Travel History
Traveled to [place] past 21 day No
 
Medical History
Neurological: NONE
EENT: NONE
Respiratory: obstructive sleep apnea (on CPAP)
Gastrointestinal: NONE
Hepatic: NONE
Renal: NONE
Musculoskeletal: NONE
Psychiatric: NONE
Endocrine: NONE
Blood Disorders: NONE
Cancer(s): NONE
GYN/Reproductive: NONE
 
Surgical History
Surgical History: right knee surgery x 2
 
Past Family/Social History
 
Family History
Relations & Conditions if any
FATHER ( young of unclear cause).
 
 
Psychosocial History
Services at Home: None
 
Review of Systems
 
Review of Systems
Constitutional:
Reports: see HPI. 
 
Exam & Diagnostic Data
Last 24 Hrs of Vital Signs/I&O
 Vital Signs
 
 
Date Time Temp Pulse Resp B/P B/P Pulse O2 O2 Flow FiO2
 
     Mean Ox Delivery Rate 
 
 0203 97.8 56 18 106/62  96 Room Air  
 
 2249 97.7 65 20 95/50  94 Room Air  
 
 2130 97.8 58 20 102/54  94 Room Air  
 
 1928 98.1 59 18 103/67  95 Room Air  
 
 1748 97.5 58 22 99/56  94 Room Air  
 
 
 Intake & Output
 
 
  0800 08 0000  1600
 
Intake Total  0 
 
Output Total   
 
Balance  0 
 
    
 
Intake, Oral  0 
 
Patient 133.81 kg 133.81 kg 
 
Weight   
 
 
 
 
Assessment/Plan
 
As Ranked By This Provider
Problem List:
 1. Cardiac arrhythmia
 
 
Core Measures/Misc ()
 
Acute Coronary Syndrome
ACS Diagnosis: No
 
Congestive Heart Failure
Congestive Heart Failure Diagnosis No
 
Cerebrovascular Accident
CVA/TIA Diagnosis: No
 
VTE (View Protocol)
VTE Risk Factors Age>40
No Mechanical VTE Prophylaxis d/t N/A MechProphylax Ordered
No VTE Pharm Prophylaxis d/t NA PharmProphylax ordered
 
Sepsis (View protocol)
Sepsis Present: No
If YES complete Sepsis Event Note If YES complete Sepsis Event Note
 
 
Audra RODRIGUEZ,Laura 18:
Core Measures/Misc ()
 
Sepsis (View protocol)
If YES complete Sepsis Event Note If YES complete Sepsis Event Note
 
Attending MD Review Statement
 
Attending Statement
Attending MD Statement: examined this patient, discuss w/resident/PA/NP, agreed 
w/resident/PA/NP
Attending Assessment/Plan:
This is a 37-year-old male who was recently discharged scrotal incidentally 
found to have SVT in the emergency department and was started on a beta-blocker.
 He had a Holter monitor placed a few days ago and received a phone call from 
his primary care doctor that he was having 6-8 beats of V. tach.  Subsequently 
he was readmitted to the hospital for observation on telemetry.  We will 
continue his current beta-blocker dosage along with aspirin.  Cardiology will 
evaluate the patient he will likely need an EP study and echocardiogram.

## 2018-08-01 NOTE — ECHOCARDIOGRAM REPORT
IRMA GILMORE 
 
 Age:    37     :    1980      Gender:     M 
 
 MRN:    [id number] 
 
 Exam Date:     2018  
                18:56 
 
 Exam Location: ER 
 
 Ht (in):     70      Wt (lb):      295     BSA:    2.63 
 
 BP:          112     /     64 
 
 Ordering Physician:        Daniel Orta MD 
 
 Referring Physician:       Jaden Barcenas MD,  
                            PhD 
 
 Technologist:              [facility] 
 
 Room Number:               ER#22 
 
 Indications:       Arrhythmias 
 
 Rhythm:                 Sinus 
 
 Technical Quality:      good 
 
 
 FINDINGS 
 Left Ventricle 
 Normal left ventricular size, wall thickness and systolic function  
 with no obvious regional wall motion abnormalities.  Normal left  
 ventricular diastolic filling pattern for age.  The ejection  
 fraction is visually estimated at 60%. 
 
 Right Ventricle 
 The right ventricle is normal in size and function. 
 
 Right Atrium 
 The right atrium is normal in size. 
 
 Left Atrium 
 The left atrium is normal in size.  The interatrial septum is  
 intact. 
 
 Mitral Valve 
 The mitral valve is normal in structure and function.  There is  
 trace mitral regurgitation. 
 
 Aortic Valve 
 Structurally normal aortic valve without significant sclerosis or  
 stenosis.  There is no aortic regurgitation. 
 
 Tricuspid Valve 
 The tricuspid valve is normal in structure and function.  There is  
 trace tricuspid regurgitation.  Pulmonary artery systolic pressure  
 is normal. 
 
 Pulmonic Valve 
 Structurally normal pulmonic valve.  There is no pulmonic  
 regurgitation. 
 
 Pericardium 
 Normal pericardium without effusion.  No pleural effusion. 
 
 Great Vessels 
 Normal aortic root dimension.  The aortic arch and great vessels are  
 well seen and are normal. 
 
 CONCLUSIONS 
 1. Normal EF of 60% 
 2. Trace mitral regurgitation. 
 3. Trace tricuspid regurgitation. 
 
 Jaden Barcenas M.D. 
 (Electronically Signed) 
 Final Date:      2018  
                  10:57 
 
 MEASUREMENTS  (Male / Female) Normal Values 
 
 2D ECHO 
 LV Diastolic Diameter PLAX                          3.9 cm           4.2 - 5.9 
/ 3.9 - 5.3 cm 
 LV Systolic Diameter PLAX                           2.6 cm           2.1 - 4.0 
cm 
 LV Fractional Shortening PLAX                       33.3 %           25 - 46  %
 
 LV Ejection Fraction 2D Teich                       62.7 %            
 IVS Diastolic Thickness                             1.2 cm            
 LVPW Diastolic Thickness                            1.2 cm            
 LV Relative Wall Thickness                          0.6               
 RV Internal Dim ED PLAX                             2.9 cm           1.9 - 3.8 
cm 
 LVOT Diameter                                       2.4 cm            
 Aortic Root Diameter                                3.9 cm            
 LA Systolic Diameter LX                             3.9 cm           3.0 - 4.0 
/ 2.7 - 3.8 cm 
 LA Volume                                           50.0 cm         18 - 58 / 
22 - 52 cm 
 Ascending Aorta Diameter                            3.6 cm            
 
 DOPPLER 
 AV Peak Velocity                                    111.0 cm/s        
 AV Peak Gradient                                    4.9 mmHg          
 AV Mean Velocity                                    75.0 cm/s         
 AV Mean Gradient                                    3.0 mmHg          
 AV Velocity Time Integral                           24.8 cm           
 LVOT Peak Velocity                                  101.0 cm/s        
 LVOT Peak Gradient                                  4.1 mmHg          
 LVOT Mean Velocity                                  73.1 cm/s         
 LVOT Mean Gradient                                  2.0 mmHg          
 LVOT Velocity Time Integral                         23.6 cm           
 LVOT Stroke Volume                                  106.8 cm         
 AV Area Cont Eq vti                                 4.3 cm           
 AV Area Cont Eq pk                                  4.1 cm           
 MV Peak Velocity                                    109.0 cm/s        
 MV Peak Gradient                                    4.8 mmHg          
 MV Mean Velocity                                    55.9 cm/s         
 MV Mean Gradient                                    1.0 mmHg          
 Mitral E Point Velocity                             95.8 cm/s         
 Mitral A Point Velocity                             57.8 cm/s         
 Mitral E to A Ratio                                 1.7               
 MV PHT Velocity                                     113.0 cm/s        
 MV Deceleration Emery                               419.0 cm/s       
 MV Pressure Half Time                               80.9 ms           
 MV Area PHT                                         2.7 cm           
 MV Deceleration Time                                187.0 ms          
 TR Peak Velocity                                    194.0 cm/s        
 TR Peak Gradient                                    15.1 mmHg         
 Right Atrial Pressure                               5.0 mmHg          
 Pulmonary Artery Systolic Pressure                  20.1 mmHg         
 Right Ventricular Systolic Pressure                 20.1 mmHg         
 PV Peak Velocity                                    103.0 cm/s        
 PV Peak Gradient                                    4.2 mmHg          
 PV Mean Velocity                                    69.1 cm/s         
 PV Mean Gradient                                    2.0 mmHg          
 PV Velocity Time Integral                           21.6 cm           
 LV E' Lateral Velocity                              16.0 cm/s         
 Mitral E to LV E' Lateral Ratio                     6.0               
 LV E' Septal Velocity                               12.6 cm/s         
 Mitral E to LV E' Septal Ratio                      7.6

## 2018-08-01 NOTE — PN- ATT ADDEND
Attending Addendum
Attending Brief Note
Patient seen and examined.  37-year-old male past history of opiate dependence, 
obesity who was here yesterday in observation status for an SVT.  He was seen by
cardiology, started on metoprolol and aspirin and discharged.  However his PCP 
called him and called the cardiologist that the recent Holter that the patient 
had showed episodes of V. tach rather than SVT.  He is readmitted for the same. 
He had an echocardiogram and he is on the beta blocker and aspirin and will 
follow-up as per cardiology.

## 2018-08-02 ENCOUNTER — HOSPITAL ENCOUNTER (INPATIENT)
Dept: HOSPITAL 68 - ERH | Age: 38
LOS: 4 days | DRG: 309 | End: 2018-08-06
Attending: INTERNAL MEDICINE | Admitting: INTERNAL MEDICINE
Payer: COMMERCIAL

## 2018-08-02 VITALS — HEIGHT: 72 IN | WEIGHT: 305 LBS | BODY MASS INDEX: 41.31 KG/M2

## 2018-08-02 VITALS — DIASTOLIC BLOOD PRESSURE: 90 MMHG | SYSTOLIC BLOOD PRESSURE: 160 MMHG

## 2018-08-02 VITALS — SYSTOLIC BLOOD PRESSURE: 108 MMHG | DIASTOLIC BLOOD PRESSURE: 60 MMHG

## 2018-08-02 VITALS — DIASTOLIC BLOOD PRESSURE: 80 MMHG | SYSTOLIC BLOOD PRESSURE: 120 MMHG

## 2018-08-02 DIAGNOSIS — G47.33: ICD-10-CM

## 2018-08-02 DIAGNOSIS — F11.20: ICD-10-CM

## 2018-08-02 DIAGNOSIS — I49.8: ICD-10-CM

## 2018-08-02 DIAGNOSIS — I49.3: ICD-10-CM

## 2018-08-02 DIAGNOSIS — R07.9: ICD-10-CM

## 2018-08-02 DIAGNOSIS — E66.9: ICD-10-CM

## 2018-08-02 DIAGNOSIS — I47.2: Primary | ICD-10-CM

## 2018-08-02 DIAGNOSIS — I47.1: ICD-10-CM

## 2018-08-02 DIAGNOSIS — Z86.19: ICD-10-CM

## 2018-08-02 LAB
ABSOLUTE GRANULOCYTE CT: 4.9 /CUMM (ref 1.4–6.5)
BASOPHILS # BLD: 0.1 /CUMM (ref 0–0.2)
BASOPHILS NFR BLD: 0.7 % (ref 0–2)
EOSINOPHIL # BLD: 0.2 /CUMM (ref 0–0.7)
EOSINOPHIL NFR BLD: 2.7 % (ref 0–5)
ERYTHROCYTE [DISTWIDTH] IN BLOOD BY AUTOMATED COUNT: 15.3 % (ref 11.5–14.5)
GRANULOCYTES NFR BLD: 66.2 % (ref 42.2–75.2)
HCT VFR BLD CALC: 37.4 % (ref 42–52)
LYMPHOCYTES # BLD: 2 /CUMM (ref 1.2–3.4)
MCH RBC QN AUTO: 27.9 PG (ref 27–31)
MCHC RBC AUTO-ENTMCNC: 33.6 G/DL (ref 33–37)
MCV RBC AUTO: 83.1 FL (ref 80–94)
MONOCYTES # BLD: 0.3 /CUMM (ref 0.1–0.6)
PLATELET # BLD: 324 /CUMM (ref 130–400)
PMV BLD AUTO: 7.5 FL (ref 7.4–10.4)
RED BLOOD CELL CT: 4.5 /CUMM (ref 4.7–6.1)
WBC # BLD AUTO: 7.4 /CUMM (ref 4.8–10.8)

## 2018-08-02 PROCEDURE — G0378 HOSPITAL OBSERVATION PER HR: HCPCS

## 2018-08-02 PROCEDURE — A9502 TC99M TETROFOSMIN: HCPCS

## 2018-08-02 PROCEDURE — 86618 LYME DISEASE ANTIBODY: CPT

## 2018-08-02 NOTE — PN- HOUSESTAFF
Sebastian Sher José Miguel 18 1458:
Subjective
Follow-up For:
Possible arrhythmias
Complaints: multiple admissions for arrhythmia
Tele-Events Since Last Visit:
none yet
Subjective:
Mr. Waldrop is a 37-year-old man past medical history of opiate dependence on 
Suboxone, had recent hospital evaluation for possible arrhythmias.  His 
outpatient Holter monitoring showed ventricular tachycardia and was on 
metoprolol 25 mg twice daily.  However yesterday he had chest tightness and was 
brought to the hospital this morning.  He says he is having palpitations and 
associated lightheadedness multiple brief episodes and persistent despite 
metoprolol use.  No history of shortness of breath
 
Review of Systems
Constitutional:
Reports: see HPI. 
 
Objective
Last 24 Hrs of Vital Signs/I&O
 Vital Signs
 
 
Date Time Temp Pulse Resp B/P B/P Pulse O2 O2 Flow FiO2
 
     Mean Ox Delivery Rate 
 
 1210 98.1 53 18 108/60  94 Room Air  
 
 1103 97.8 65 18 128/57  98   
 
 1003 97.6 53 18 105/56  983   
 
 0815 98.1 55 18 112/63  98   
 
 0721 98.6 92 18 123/81  98 Room Air  
 
 
 Intake & Output
 
 
  1600  0800  0000
 
Intake Total 400  
 
Output Total   
 
Balance 400  
 
    
 
Intake, Oral 400  
 
Patient 298 lb 230 lb 
 
Weight   
 
Weight  Reported by Patient 
 
Measurement   
 
Method   
 
 
 
 
Physical Exam
General Appearance: Alert, Oriented X3, Cooperative, No Acute Distress
Cardiovascular: Normal S1, Normal S2, No Murmurs
Lungs: Normal Air Movement
Abdomen: Soft, No Tenderness, No Hepatospenomegaly
Neurological: Normal Speech, Strength at 5/5 X4 Ext, Normal Tone, Sensation 
Intact
Extremities: No Clubbing, No Cyanosis, No Edema, Normal Pulses, No Tenderness/
Swelling
Current Medications:
 Current Medications
 
 
  Sig/Namrata Start time  Last
 
Medication Dose Route Stop Time Status Admin
 
Acetaminophen 1,000 MG DAILY PRN  1130 AC 
 
  IV   
 
Acetaminophen 650 MG Q6P PRN  1045 AC 
 
  PO   
 
Aspirin 0 .STK-MED ONE  1045 DC 
 
  PO   
 
Aspirin Buffered 81 MG DAILY  0900 AC 
 
  PO   
 
Aspirin Buffered 325 MG STAT ONE  1045 DC 
 
  PO  1046  1059
 
Atorvastatin Calcium 40 MG 1700  1700 AC 
 
  PO   
 
Buprenorphine/ 1 TAB BID  2100 AC 
 
Naloxone  SL   
 
Heparin Sodium  5,000 UNIT Q8  1400 AC 
 
(Porcine)  SC   
 
Magnesium Chloride 64 MG BID  1455 AC 
 
  PO   
 
Metoprolol Tartrate 25 MG BID  2100 AC 
 
  PO  2300  
 
Nitroglycerin 1 GM Q6 PRN  1045 AC 
 
  TOP   
 
 
 
 
Last 24 Hrs of Lab/Gabe Results
Last 24 Hrs of Labs/Mics:
 Laboratory Tests
 
18 1342:
Troponin I Pending
 
18 0805:
Anion Gap 9, Estimated GFR > 60, BUN/Creatinine Ratio 20.0, Glucose 104  H, 
Calcium 9.1, Magnesium 1.9, Total Bilirubin 0.5, AST 24, ALT 56, Alkaline 
Phosphatase 75, Troponin I < 0.01, Total Protein 6.7, Albumin 3.8, Globulin 2.9,
Albumin/Globulin Ratio 1.3, CBC w Diff NO MAN DIFF REQ, RBC 4.50  L, MCV 83.1, 
MCH 27.9, MCHC 33.6, RDW 15.3  H, MPV 7.5, Gran % 66.2, Lymphocytes % 26.3, 
Monocytes % 4.1, Eosinophils % 2.7, Basophils % 0.7, Absolute Granulocytes 4.9, 
Absolute Lymphocytes 2.0, Absolute Monocytes 0.3, Absolute Eosinophils 0.2, 
Absolute Basophils 0.1
 
 
Assessment/Plan
Assessment:
Mr. Dorsey is a 37-year-old male patient with GIOVANNY, opiate dependence on 
Suboxone for 9 years, multiple recent hospital evaluations for possible 
arrhythmias 2018 to 2018, came to the hospital today complaining of 
chest tightness, palpitations and associated lightheadedness despite metoprolol 
25 mg.
Problem List:
 1. Ventricular tachycardia
    
Patient is a 37-year-old male with multiple episodes of V. tach's, nonsustained 
admitted for evaluation.  He is already on metoprolol 25 mg twice daily.
-Patient is on Suboxone past 9 years
-Echocardiogram showed EF of 60%, trace mitral and tricuspid regurgitation
-He is suspected of unstable angina and is scheduled for stress test tomorrow 9:
30 AM
-Cardiac enzymes every 6-8 hours
-Continue metoprolol 25 mg twice daily and aspirin
-Discussed about tapering off Suboxone, patient is be willing to try but 
realizes it would be difficult to do so.
-N.p.o. starting midnight tonight on 2018 for stress test tomorrow.
 
Pain Ratin
Pain Location:
chest
Alt Method for Pain Treatment: Other(free text)
Pain Goal: Remain pain free
Pain Plan:
tyelenol
Tomorrow's Labs & Rationales:
stress test for suspected Coronary artery disease
DVT/Prophylaxis: pharmacological
Consulting Request:
   Consulting Specialty: Cardiology
   Consulting Physician:
Dr. Barcenas
   Reason for Consult: V tach
 
Discharge Plan
Anticipated Discharge (Day): tomorrow
 
 
Karon Wakefield MD 18 0823:
Attending MD Review Statement
 
Attending Statement
Attending MD Statement: examined this patient, discuss w/resident/PA/NP, agreed 
w/resident/PA/NP, discussed with family, reviewed EMR data (avail), discussed 
with case mgmt
Attending Assessment/Plan:
See H & P

## 2018-08-02 NOTE — ADMISSION CERTIFICATION
Admission Certification
 
Certification Statement
- As attending physician, I certify that at the time of
- admission, based on clinical presentation, severity of
- symptoms, need for further diagnostic testing and
- therapeutic interventions, and risk of adverse outcomes
- without in-hospital treatment, in my clinical assessment,
- this patient requires an acute hospital stay for a minimum
- of two nights or longer. I have also considered psychsocial
- factors such as support system, advanced age, financial
- issues, cognitive issues, and failed out-patient treatments,
- past re-admission history, safety of patient, and lack of
- compliance as applicable.
Specific rationale supporting this admission is:
Chets pain and VT on holter

## 2018-08-02 NOTE — HISTORY & PHYSICAL
Chano[place]ah 18 1029:
General Information and HPI
MD Statement:
I have seen and personally examined IRMA GILMORE and documented this H&P.
 
The patient is a 37 year old M who presented with a patient stated chief 
complaint of 
 
Source of Information: patient
Exam Limitations: no limitations
History of Present Illness:
 
Mr Gilmore is a 37 year old man w/ a PMHx of class 2 obesity, GIOVANNY, opiate 
dependence( on Suboxone ), multiple recent hospital evaluations for possible 
arrythmias 2018-2018, came in to the hospital with a chief concern of 
chest tightness on the am of presentation.
 
He was known to be in his usual state of health until one month ago. He reported
multiple episodes of brief episodes of palpitations associated with 
lightheadeness. No LOC. He developed acute onset of palpitations, associated w/ 
lightheadedness, dyspnea, and diaphoresis one week ago.  After he was evaluated 
by Holter monitoring by his primary care physician.  He was evaluated at [facility] twice so far in between 2018-2018, and was treated with a 
beta blocker for likely SVT.  Upon analyzing Holter monitor rhythm it was found 
that he had a few symptomatic nonsustained VT, likely secondary to Suboxone use 
or any anatomic causes, and hence and cardiac MRI was planned as an outpatient 
by his cardiologist.  He [facility] this a.m[facility] with chief 
concerns of chest discomfort after he had another episode of palpitations 
associated w/ palpitations in the am after his shower; described the chest 
discomfort as tightness in the center of chest, no radiation, lasted for 5-10 
min. No LOC, no pedal edema, no neurological weakness. Has been compliant with 
his medications. No recent ivda, alcohol or any other stimulant use. Has been 
using CPAP regularly. No recent tremors. No recent weight loss or weight gain. 
 
Allergies/Medications
Allergies:
Coded Allergies:
No Known Allergies (18)
 
Home Med list
Aspirin (Ecotrin*) 81 MG TABLET.   1 TAB PO DAILY chest pain
Buprenorphine HCl/Naloxone HCl (Suboxone 8 MG-2 MG Sl Film) 8 MG-2 MG FILM   1 
STR SL BID OPIATE DEPENDENCE  (Reported)
Metoprolol Tartrate 25 MG TABLET   1 TAB PO BID HEART
 
Compliance With Home Meds: GOOD
 
Past History
 
Travel History
Traveled to [place] past 21 day No
 
Medical History
Neurological: NONE
EENT: NONE
Cardiovascular: NSVT
Respiratory: obstructive sleep apnea (on CPAP)
Gastrointestinal: NONE
Hepatic: NONE
Renal: NONE
Musculoskeletal: NONE
Psychiatric: NONE
Endocrine: NONE
Blood Disorders: NONE
Cancer(s): NONE
GYN/Reproductive: NONE
History of MRSA: No
History of VRE: No
History of CDIFF: No
 
Surgical History
Surgical History: right knee surgery x 2
 
Past Family/Social History
 
Family History
Relations & Conditions if any
FATHER ( young of unclear cause).
 
 
Psychosocial History
Services at Home: None
ETOH Use: occasional use
Illicit Drug Use: denies illicit drug use
 
Functional Ability
ADLs
Independent: dressing, eating, toileting, bathing. 
Ambulation: independent
IADLs
Independent: shopping, housework, finances, food prep, telephone, transportation
, medication admin. 
 
Employment History
Employment Employed
 
Review of Systems
 
Review of Systems
Constitutional:
Reports: see HPI.  Denies: chills, fever. 
EENTM:
Denies: blurred vision, visual changes. 
Cardiovascular:
Denies: chest pain. 
Respiratory:
Denies: cough, short of breath. 
GI:
Reports: see HPI.  Denies: diarrhea, nausea. 
Genitourinary:
Denies: discharge. 
Musculoskeletal:
Denies: back pain, joint pain. 
Skin:
Denies: change in skin color, change in hair/nails. 
Neurological/Psychological:
Denies: anxiety, numbness. 
Hematologic/Endocrine:
Denies: bruising. 
 
Exam & Diagnostic Data
Last 24 Hrs of Vital Signs/I&O
 Vital Signs
 
 
Date Time Temp Pulse Resp B/P B/P Pulse O2 O2 Flow FiO2
 
     Mean Ox Delivery Rate 
 
 1003 97.6 53 18 105/56  983   
 
 0815 98.1 55 18 112/63  98   
 
 0721 98.6 92 18 123/81  98 Room Air  
 
 
 Intake & Output
 
 
  1600  0800  0000
 
Intake Total 0  
 
Output Total   
 
Balance 0  
 
    
 
Intake, Oral 0  
 
Patient  230 lb 
 
Weight   
 
Weight  Reported by Patient 
 
Measurement   
 
Method   
 
 
 
 
Physical Exam
General Appearance Alert, Oriented X3, Cooperative, No Acute Distress
Skin No Rashes, No Breakdown, No Significant Lesion
Skin Temp/Moisture Exam: Warm/Dry
Sepsis Skin Exam (color): Normal for Ethnicity, Cyanotic, Flushed
HEENT Atraumatic, PERRLA, EOMI
Neck Supple, No JVD, No thryomegaly, +2 Carotid Pulse wo Bruit
Lymphatic Axillary nl, Cervical nl
Cardiovascular Regular Rate, Normal S1, Normal S2, No Murmurs
Lungs Clear to Auscultation, Normal Air Movement
Abdomen Normal Bowel Sounds, Soft, No Tenderness
Neurological Normal Speech, Strength at 5/5 X4 Ext, Normal Tone, Sensation 
Intact, Cranial Nerves 3-12 NL, Reflexes 2+
Extremities No Clubbing, No Cyanosis, No Edema, Normal Pulses
Vascular Normal Pulses, Pulses Symmetrical
Sepsis Peripheral Pulse Location: Dorsalis Pedis
Sepsis Peripheral Pulse Exam: Normal
Last 24 Hrs of Labs/Gabe:
 Laboratory Tests
 
18 0805:
Anion Gap 9, Estimated GFR > 60, BUN/Creatinine Ratio 20.0, Glucose 104  H, 
Calcium 9.1, Magnesium Pending, Total Bilirubin 0.5, AST 24, ALT 56, Alkaline 
Phosphatase 75, Troponin I < 0.01, Total Protein 6.7, Albumin 3.8, Globulin 2.9,
Albumin/Globulin Ratio 1.3, CBC w Diff NO MAN DIFF REQ, RBC 4.50  L, MCV 83.1, 
MCH 27.9, MCHC 33.6, RDW 15.3  H, MPV 7.5, Gran % 66.2, Lymphocytes % 26.3, 
Monocytes % 4.1, Eosinophils % 2.7, Basophils % 0.7, Absolute Granulocytes 4.9, 
Absolute Lymphocytes 2.0, Absolute Monocytes 0.3, Absolute Eosinophils 0.2, 
Absolute Basophils 0.1
 
 
Diagnostic Data
EKG Results
NSR, irregular, No STTWI. 
 
Assessment/Plan
Assessment:
 
Mr Gilmore is a 37 year old man w/ a PMHx of class 2 obesity, GIOVANNY, opiate 
dependence( on Suboxone ), multiple recent hospital evaluations for possible 
ventriuclar arrythmias 2018-2018, came in to the hospital with a chief
concern of chest tightness a few hours prior to presentation to the ER. 
 
At the time of admissoin, vitals temp 98.6, WI 92, RR 18, /81, 98 RA.  EKG
revealed NSR, multiple PACs, No STTWI. 
 
Pertinent lab findings:
 
 
 
 
WBC 7.4, Hb 12.6, platelets 324
 
Na 140, K 4.1, HCO3 27, Mg 1.9
 
BUN 14, Cr 1.7
 
AST 24, ALT 56, Alk p 75. 
 
Trop 1 0.01
 
TSH 1.11 ( )
 
 
 
CXR (  ) No acute cardiopulmonary process.
 
Echo 18
 1. Normal EF of 60% 
 2. Trace mitral regurgitation. 
 3. Trace tricuspid regurgitation. 
 4. Pulmonary artery systolic pressure is normal. 
 
Etiology in this case of a young gentleman is likely unstable angina with 
symptoms of angina w/o elevated cardiac enzymes with positive cardiac risk 
factors, ACS needs to be ruled out. Having had NSVT, structural causes need to 
be ruled out in his case, and MI leading to scar tissue is the most important 
cause that needs to be ruled out. Other causes such as conduction defects, 
amyloidosis, ion channel defects or drugs causing increase in QTc are in 
differential. As per the cardiologist, he needs to undergo a stress test to r/o 
any ischemia. 
 
Problem list:
 
1. Unstable angina, r/o ACS
2. h/o NSVT
3. h/o GIOVANNY
 
# admit to telemetry for cardiac monitoring of any arrythmias.
# non enteric coated aspirin 325 mg given and daily aspirin and ASA 81, 
Atorvastatin 80mg daily.
# serial electrocardiograms, cardiac enzymes every 6-8 hours
# for the management of angina- NTG 0.5mg q6 prn
# metoprolol succinate 25mg BID. 
# No IV heparin at this time. Discuss w/ cards, if needed during the hospital 
course. 
# discuss early intervention with cardiac catheterization, if he develops any 
more symptoms.
# daily Ins and Outs,daily weights
# discuss about slowly tapering off suboxone, which is challenging in this case.
 
# Stress test as per cardiology. 
 
Code status- Full code. 
NPO at midnight
Hold beta blocker in the am. Restart after the procedure. 
Pain pathway- tylenol prn. Opiates cant be ordered. Pt on suboxone. 
 
 
 
 
 
 
 
 
 
 
 
As Ranked By This Provider
Problem List:
 1. ACS (acute coronary syndrome)
 
 2. Ventricular tachycardia
 
 3. Palpitations
 
 4. Acute electrocardiogram changes
 
 
Core Measures/Misc ()
 
Acute Coronary Syndrome
ACS Diagnosis: No
 
Congestive Heart Failure
Congestive Heart Failure Diagnosis No
 
Cerebrovascular Accident
CVA/TIA Diagnosis: No
 
VTE (View Protocol)
VTE Risk Factors Acute Medical Illness
No Mechanical VTE Prophylaxis d/t N/A MechProphylax Ordered
No VTE Pharm Prophylaxis d/t NA PharmProphylax ordered
 
Sepsis (View protocol)
Sepsis Present: No
If YES complete Sepsis Event Note If YES complete Sepsis Event Note
 
 
Twyla RODRIGUEZ,Karon 18 1330:
Core Measures/Misc ()
 
Sepsis (View protocol)
If YES complete Sepsis Event Note If YES complete Sepsis Event Note
 
Attending MD Review Statement
 
Attending Statement
Attending MD Statement: examined this patient, discuss w/resident/PA/NP, agreed 
w/resident/PA/NP, reviewed EMR data (avail), discussed with nursing, discussed 
with case mgmt
Attending Assessment/Plan:
37-year-old male past medical history of chronic opiate dependence, obesity.  He
was here initially on Monday with what we thought was an SVT.  At that point we 
discharged him on a beta-blocker and aspirin after he was seen by Dr. Barcenas.  
He returned because his PCP called him that what was on the Holter was actually 
V. tach and not in SVT.  At that point we did an echocardiogram which showed a 
normal EF and no wall motion abnormalities and we left him on the beta-blocker 
and the aspirin with the plan for outpatient testing.  When he went home he 
started developing these feelings of chest pain at rest with a fluttering 
sensation and a feeling of near syncope.  This time we have him admitted, we are
continuing his beta-blocker and aspirin and will do a nuclear stress test to 
make sure this is not an ischemic VT that is causing the symptoms.  Will put him
on DVT prophylaxis, continue his Suboxone for now and follow closely.

## 2018-08-02 NOTE — PN- STUDENT
Subjective
Subjective:
Intro: 
Pt is a 37YOM with PMH of GIOVANNY on CPAP and on suboxone for opioid dependence 
after R knee surgery x2 for the past 11 years. He has been experiencing 
paroxysmal palpiations, SOB, nausea and diaphoresis for the past month with 
Holter monitor positive for episodes of VT.
 
HPI: 
37YOM w/ PMH of GIOVANNY on suboxone for opioid dependence after R knee surgery for 
past 11yrs presented to the ED this morning due to an episode of chest tightness
, SOB, nausea, pre-syncope spots in his vision, and diaphoresis which lasted for
approximately 20 minutes. The tightness was retrosternal squeezing sensation and
did not radiate. The patient had one other similar episode around midnight which
woke him from sleep and lasted only 5 minutes. Pt denies associated chest pain, 
radiation of feeling into his arm/neck/jaw, edema, weakness, and abdominal pain.
 
Pt has been having episodes similar to the one that brought him in with 
increasing frequency for 1 mo but they ususally only last 10 seconds or so. The 
patient was evaluated for these symptoms 2x since 18 at this hospital and 
has been discharged twice, both times in stable condition on metoprolol 25mg BID
and ASA 81mg q24. 
 
Recent Holter monitor has shown VT with longest episode of 6 beats with rate of 
~200 beats per minute. It also showed SVT with longest run of 7 beats at ~100 
bpm. Bradycardia was also present with rate varying from 36 to 50. 
 
Meds: 
Metoprolol 25mg BID
ASA 81mg OD 
Suboxone 1 tab BID
 
Allergies: 
NKDA
 
PMH: 
GIOVANNY on CPAP [id number]
R knee injury 
Opioid use disorder, substance free 11 yrs
Lyme Disease, tx with doxy, 19yrs ago
Targetoid rash on LUE 6mo ago, asymptomatic so didn't seek tx, resolved 
Panic attacks as a child, last one 15yrs prior
 
PSH: 
R knee x2
 
Fam: 
Aunt and uncle, both paternal, DM
Father: PCKD,  
 
Travel: 
[place]
Lots of estrada/outdoor activities in [facility]
 
Social:
Pt is  and has 2 children. He denies drug use at this time, though has 
used prescription opioids in the past. He is an occasional smoker 1-2cig/mo and 
drinks 1-2 beers/mo as well. He is monogamous with his wife.
 
 
Objective
Objective:
Vital Signs
 
 
Date Time Temp Pulse Resp B/P B/P Pulse O2 O2 Flow FiO2
 
     Mean Ox Delivery Rate 
 
 1210 98.1 53 18 108/60  94 Room Air  
 
 1103 97.8 65 18 128/57  98   
 
 1003 97.6 53 18 105/56  983   
 
 0815 98.1 55 18 112/63  98   
 
 0721 98.6 92 18 123/81  98 Room Air  
 
 
Tele: SB and NSR rate from 50-60bpm, no arrhythmias or premature beats, no 
pauses
 
PE: 
Gen - NAD, comfortable, cooperative
Neuro - AOx4
Psych - appropriatley anxious 
CV - bradycardic, normal S1 and S2, no MRG
Pulm - CTA BL 
Abd - obese, nontender, soft
Ext - DP/PT 2+ BL, no edema, warm and well perfused
 
 
 
Results
Results:
Laboratory Tests
 
18 0805:
Anion Gap 9, Estimated GFR > 60, BUN/Creatinine Ratio 20.0, Glucose 104  H, 
Calcium 9.1, Magnesium 1.9, Total Bilirubin 0.5, AST 24, ALT 56, Alkaline 
Phosphatase 75, Troponin I < 0.01, Total Protein 6.7, Albumin 3.8, Globulin 2.9,
Albumin/Globulin Ratio 1.3, CBC w Diff NO MAN DIFF REQ, RBC 4.50  L, MCV 83.1, 
MCH 27.9, MCHC 33.6, RDW 15.3  H, MPV 7.5, Gran % 66.2, Lymphocytes % 26.3, 
Monocytes % 4.1, Eosinophils % 2.7, Basophils % 0.7, Absolute Granulocytes 4.9, 
Absolute Lymphocytes 2.0, Absolute Monocytes 0.3, Absolute Eosinophils 0.2, 
Absolute Basophils 0.1
 
 
 
Assessment/Plan
Assessment:
Assessment: 
Pt is a 37YOM w/ PMH of GIOVANNY on CPAP and on suboxone for opioid dependence after 
R knee surgery for past 11yrs presented to the ED today with 20 minutes of chest
tightness concerning for angina or arrhythmia. Pt is admitted to tele as he has 
been having these episodes for 1 mo and has been evaluated 2x here for these sx 
and has had a Holter by PCP showing Vtach and SVT. He was placed on metoprolol 
and ASA 4 days ago. ECHO from 18 shows EF of 60% with no abnormalities. 
Today 1st trop is <0.01. EKG changes are apparent from previous EKG on 18, 
showing sinus pauses. Repeat EKG at 1pm shows NSR, intervals WNL, and no STT 
change consistent with EKG from 18. 
 
Problem List/Plan: 
1. Vtach 
Pt is stable at this time with tele monitor showing sinus evan and NSR on 
metoprolol. Etiology of Vtach is unclear at this time but Ddx includes QTc 
prolongation d/t suboxone, ischemia, congenital heart abnormality.
* Continue metoprolol 25mg BID. Continue ASA 81mg PO OD.
* Follow trops and EKG, 1pm, 8pm. 
* Recommend full ischemic workup inpatient tomorrow.
* Cardio Dr. Barcenas to consult.
 
2. GIOVANNY
Pt is stable at this time and uses CPAP at home every night. Pt reports good 
adherence.
* Will order CPAP for tonight at pt's reported setting of [id number]. 
 
3. Opioid Use Disorder 
Pt is stable at this time on suboxone but there is concern as suboxone includes 
buprenorphine which can cause QTc prolongation. 
* At this time we will continue suboxone 8mg/2mg sublingual tablet BID. 
* Await cardio recommendation for need to taper suboxone as this would involve 
much coordination with patient's outpatient provider.
 
Diet: Regular
DVT Proph: Hep SC q8hrs
Code: Full

## 2018-08-02 NOTE — ED CARDIAC/CP/PALPITATIONS
History of Present Illness
 
General
Chief Complaint: Chest Pain
Stated Complaint: HX SVT
Source: patient
Exam Limitations: no limitations
 
Vital Signs & Intake/Output
Vital Signs & Intake/Output
 Vital Signs
 
 
Date Time Temp Pulse Resp B/P B/P Pulse O2 O2 Flow FiO2
 
     Mean Ox Delivery Rate 
 
 0721 98.6 92 18 123/81  98 Room Air  
 
 
 
Allergies
Coded Allergies:
No Known Allergies (18)
 
Reconcile Medications
Aspirin (Ecotrin*) 81 MG TABLET.DR   1 TAB PO DAILY chest pain
Buprenorphine HCl/Naloxone HCl (Suboxone 8 MG-2 MG Sl Film) 8 MG-2 MG FILM   1 
STR SL BID OPIATE DEPENDENCE  (Reported)
Metoprolol Tartrate 25 MG TABLET   1 TAB PO BID HEART
 
Triage Note:
38 YO MALE TO TRIAGE FOR EVAL OF PALPITAIONS THIS
AM WITH FEELING FAINT, REPORTS RECENT ADMISSION
FOR ?SVT PER PT. PT STATES "IT FEELS LIKE MY HEART
IS FLUTTERING, WHEN I FEEL LIKE I AM GOING TO PASS
OUT, AND THEN IT GOES AWAY" REPORTS IT HAPPENED
THIS AM, DENIES ANY PAIN/PALPITAIONS/SOB AT THIS
TIME. SKIN WAMR/DRY. EKG COMPLETED ON ARRIVAL AND
SHOWN TO MD.
Triage Nurses Notes Reviewed? yes
HPI:
Mr. Gilmore is a 38 y/o M with PMH of obesity, GIOVANNY, drug dependence, that came in
to the ED c/o  chest pain and palpitations associated with lightheadedness this 
am. The pain was described as sudden onset chest tightness, 7/10, of 15 min 
duration that apparead at rest, nonradiating in nature, that self resolved 
without any intervention. The patient was recently admitted and discharged on  on metoprolol and aspirin with the plans to be worked up as an outpatient. 
He denies diaphoresis, N/V, SOB.
(Chico Nunn MD,[facility])
 
Past History
 
Travel History
Traveled to [place] past 21 day No
 
Medical History
Any Pertinent Medical History? see below for history
Neurological: NONE
EENT: NONE
Cardiovascular: ?SVT 
Respiratory: obstructive sleep apnea (on CPAP)
Gastrointestinal: NONE
Hepatic: NONE
Renal: NONE
Musculoskeletal: NONE
Psychiatric: NONE
Endocrine: NONE
Blood Disorders: NONE
Cancer(s): NONE
GYN/Reproductive: NONE
History of MRSA: No
History of VRE: No
History of CDIFF: No
 
Surgical History
Surgical History: right knee surgery x 2
 
Psychosocial History
Who do you live with Spouse
Services at Home None
What is your primary language English
Tobacco Use: Never used
 
Family History
Family History, If Any:
FATHER ( young of unclear cause).
 
Hx Contributory? Yes
(Pb Cedeño MD)
 
Psychosocial History
ETOH Use: occasional use
Illicit Drug Use: denies illicit drug use
(Michael RODRIGUEZ,Norbert DUNCAN)
 
Review of Systems
 
Review of Systems
Constitutional:
Reports: see HPI. 
(Pb Cedeño MD)
 
Review of Systems
Constitutional:
Reports: no symptoms. 
EENTM:
Reports: no symptoms. 
Respiratory:
Reports: no symptoms. 
Cardiovascular:
Reports: see HPI, chest pain. 
GI:
Reports: no symptoms. 
Genitourinary:
Reports: no symptoms. 
Musculoskeletal:
Reports: no symptoms. 
Skin:
Reports: no symptoms. 
Neurological/Psychological:
Reports: no symptoms. 
Hematologic/Endocrine:
Reports: no symptoms. 
Immunologic/Allergic:
Reports: no symptoms. 
All Other Systems: Reviewed and Negative
(Norbert Rodriguez MD)
 
Physical Exam
 
Physical Exam
General Appearance: well developed/nourished, no apparent distress, alert, awake
, comfortable
Head: atraumatic, normal appearance
Neck: normal inspection, supple, No JVD
Respiratory: normal breath sounds, chest non-tender, no respiratory distress
Cardiovascular: Reg rate and rhythm, no murmurs, rubs or gallops
Gastrointestinal: soft, non-tender
 
Core Measures
ACS in differential dx? Yes
CVA/TIA Diagnosis No
Sepsis Present: No
Sepsis Focused Exam Completed? No
(Pb Cedeño MD)
 
Physical Exam
Eyes:
Bilateral: PERRL, EOMI. 
Back: normal inspection, normal range of motion
Extremities: normal inspection, normal capillary refill, normal range of motion,
no edema
Neurologic/Psych: no motor/sensory deficits, awake, alert, oriented x 3, normal 
gait, normal mood/affect
Skin: intact, normal color, warm/dry
(Norbert Rodriguez MD)
 
Progress
Differential Diagnosis: AMI, PSVT, PVCs/PACs
Plan of Care:
 Orders
 
 
Procedure Date/time Status
 
Heart Healthy Diet  L Active
 
Heart Healthy Diet  B Complete
 
Place in observation 914 Active
 
ED Holding Orders 914 Active
 
Vital Signs 914 Active
 
Code Status 914 Active
 
TROPONIN LEVEL  0758 Complete
 
COMPREHENSIVE METABOLIC PANEL  0758 Complete
 
CBC WITHOUT DIFFERENTIAL  075 Complete
 
EKG  0713 Active
 
 
 Laboratory Tests
 
 
18 0805:
Anion Gap 9, Estimated GFR > 60, BUN/Creatinine Ratio 20.0, Glucose 104  H, 
Calcium 9.1, Total Bilirubin 0.5, AST 24, ALT 56, Alkaline Phosphatase 75, 
Troponin I < 0.01, Total Protein 6.7, Albumin 3.8, Globulin 2.9, Albumin/
Globulin Ratio 1.3, CBC w Diff NO MAN DIFF REQ, RBC 4.50  L, MCV 83.1, MCH 27.9,
MCHC 33.6, RDW 15.3  H, MPV 7.5, Gran % 66.2, Lymphocytes % 26.3, Monocytes % 
4.1, Eosinophils % 2.7, Basophils % 0.7, Absolute Granulocytes 4.9, Absolute 
Lymphocytes 2.0, Absolute Monocytes 0.3, Absolute Eosinophils 0.2, Absolute 
Basophils 0.1
 
Initial ED EKG: normal sinus rhythm
(Pb Cedeño MD)
Prior EKG: unchanged
Rhythm Strip: normal sinus rhythm
(Norbert Rodriguez MD)
 
Departure
 
Departure
Disposition: STILL A PATIENT
Condition: Stable
Clinical Impression
Primary Impression: ACS (acute coronary syndrome)
Referrals:
Kennedy RODRIGUEZ,Brant CUEVAS III (PCP/Family)
 
Departure Forms:
Customer Survey
General Discharge Information
 
Observation Note
Spoke With:
Vishnu Cherry MD
Physician Advisor Notified: NORBERT RODRIGUEZ MD
Place Patient In: Non-ED OBS Care Area
Rationale for Observation:
My rational for observation is as follows: pt was discharged yesterday with an 
admission after runs of Vtach were seen on holter monitor, discharged on 
metoprolol and aspirin and he is now coming back again this am complaining of 
chest pain. Spoke to cardiologist who feels there's a need for further cardiac 
workup, trop trend.
 
(Pb Cedeño MD)
 
Resident Co-Sign Statement
Statement:
ED Attending supervision documentation-
 
[X] I saw and evaluated the patient. I have also reviewed all the pertinent lab 
results and diagnostic results. I agree with the findings and the plan of care 
as documented in the Resident's documentation. 
 
[X] I have reviewed the ED Record and agree with the Resident's documentation.
 
[] Additions or exceptions (if any) to the Resident's note and plan are 
summarized below:
[]
 
(Michael RODRIGUEZ,Norbert DUNCAN)
 
Critical Care Note
 
Critical Care Note
Critical Care Time: 30-74 min
(Chico Nunn MD,Pb)
 
ED Attending Observation
Initial Observation Note:
I have seen and personally examined IRMA GILMORE 
on 18 at 0808. 
 
I agree with the current emergency department documentation.  The disposition 
(admission or discharge) is uncertain at this time, he needs a period of 
observation for the following reason(s): 
 
The ED Nurse caring for this patient has been personally informed as to what the
patient is being observed for.
 
(Chico Nunn MD,Pb)

## 2018-08-02 NOTE — DISCHARGE SUMMARY
Visit Information
 
Visit Dates
Admission Date:
08/02/18
 
Discharge Date:
08/06/18
 
 
Hospital Course
 
Course
Attending Physician:
Karon Escoto
 
Primary Care Physician:
Kennedy RODRIGUEZ,Brant CUEVAS III
 
Consulting Request:
   Consulting Specialty: Cardiology
   Consulting Physician:
LISA López
Hospital Course:
Mr Rangel is a 37 year old man w/ a PMHx of class 2 obesity, GIOVANNY, opiate 
dependence( on Suboxone ), multiple recent hospital evaluations for possible 
arrythmias 7/31/2018-08/1/2018, came in to the hospital with a chief concern of 
chest tightness on the am of presentation.He developed acute onset of 
palpitations, associated w/ lightheadedness, dyspnea, and diaphoresis one week 
ago.  After he was evaluated by Holter monitoring by his primary care physician.
 He was evaluated at [facility] twice so far in between 7/31/2018-08/1/
2018, and was treated with a beta blocker for likely SVT.  Upon analyzing Holter
monitor rhythm it was found that he had a few symptomatic nonsustained VT, 
likely secondary to Suboxone use or any anatomic causes, and hence and cardiac 
MRI was planned as an outpatient by his cardiologist.  He returned to [facility] , with chief concerns of chest discomfort after he had another episode 
of palpitations associated w/ palpitations in the am after his shower; described
the chest discomfort as tightness in the center of chest, no radiation, lasted 
for 5-10 min.
Problem list:
 
1) nonsustained ventricular tachycardia 
-Patient was initially started on metoprolol 25 mg
Patient continued to have episodes of ventricular tachycardia
Dose of metoprolol was increased to 37.5 mg
echo- EF- >50%, No Ventricular dysfunction seen
Patient underwent 2 stage nuclear stress test - First part had some sustained VT
, 
Dr. Jensen will see him Outpatient for evaluation of EP and inducible VT and if 
he will need ablationn
Patient is discharged on metoprololn 37.5 mg BID and aspirin
2) GIOVANNY- 
Use of CPAP regularly
 
3) Suboxone use for remopte methadone dependence-
Patient is currently discharged , to be continued on suboxone as of now.
 
 
 
 
Allergies:
Coded Allergies:
No Known Allergies (07/30/18)
 
Significant Procedures:
Nuclear stress test result
-No definite evidence of stress-induced myocardial ischemia. Decreased
activity in the inferior wall appears greater on the resting images. However,
no definite evidence of infarct.
2. Mild diffuse hypokinesia. Ejection fraction 55%.
 
 
Disposition Summary
 
Disposition
Principal Diagnosis:
Venticular tachycardia
Additional Diagnosis:
GIOVANNY
Discharge Disposition: home or self care
 
Discharge Instructions
 
General Discharge Information
Code Status: Full Code
Patient's Diet:
regular
Patient's Activity:
as tolerated
Follow-Up Instructions/Appts:
Follow up with Dr. Jensen for EP in 1 week
Follow up with PCP in 1 week
follow up with Cardiology in 1 week
 
Medications at Discharge
Discharge Medications:
Continue taking these medications:
Buprenorphine HCl/Naloxone HCl (Suboxone 8 MG-2 MG Sl Film) 8 MG-2 MG FILM
    1 Strip SUBLINGUAL TWICE DAILY
    Comments:
       Last Taken: 8/6/18
             Time: 9:00 AM
 
Aspirin (Ecotrin*) 81 MG TABLET.DR
    1 Tablet ORAL DAILY
    Qty = 30
    Comments:
       Last Taken: 8/6/18
             Time: 9:00 AM
 
Start taking the following new medications:
Atorvastatin Calcium (Atorvastatin Calcium) 40 MG TABLET
    40 Milligram ORAL 5 PM
    Qty = 90
    No Refills
    Instructions:
       .
    Comments:
       Last Taken:8/6/18
             Time:0900
 
The following medications have been changed:
Old:
Metoprolol Tartrate (Metoprolol Tartrate) 25 MG TABLET
    1 Tablet ORAL TWICE DAILY
    Qty = 60
 
New:
Metoprolol Tartrate (Metoprolol Tartrate) 25 MG TABLET
    1.5 Tablet ORAL TWICE DAILY
    Qty = 90
    Comments:
       Last Taken: 8/6/18
             Time: 9:00 AM
 
 
Copies To:
Camila RODRIGUEZ,Rosales CABRERA; Kennedy RODRIGUEZ,Brant CUEVAS III; Narinder RODRIGUEZ PHD,Jaden MCCOY

## 2018-08-02 NOTE — CONS- CARDIOLOGY
General Information and HPI
 
Consulting Request
Date of Consult: 18
Requested By:
Twyla RODRIGUEZ,Karon CABRERA
 
History of Present Illness:
Mr. Rangel is a 37 year old male with history of obesity, obstuctive sleep apnea
and drug dependence. Over the past year he has noted intermittent palpitations 
that typically last for just seconds at a time. Over the past couple months 
these palpitations have become more frequent and typically happen a couple times
per day. Due to a prolonged episode two days ago, this patient opted to be 
evaluated in the ER. He was reportedly tachycardic but no recording was 
obtained. The palpitations were described as a rapid heart beat that did not 
cause any neck pulsation but was associated with lightheadedness and shortness 
of breath and on very rare occasions nausea. He also noted a chest tightness in 
association with any significant episode. At his baseline, he can exercise at a 
gym without symptoms. In the ER, this patient was started on Metoprolol and he 
feels that the palpitations are much improved. Nevertheless, the palpitations 
continue and are accompanied by chest tightness prompting his return to the ER.
 
Cardiac workup included a Holter monitor results which showed over 4 thousand 
PVC's including a 6 beat and 4 beat run of NSVT. His echocardiogram showed a 
normal EF and I was sent a sleep study that showed severe sleep apnea. 
 
Allergies/Medications
Allergies:
Coded Allergies:
No Known Allergies (18)
 
Home Med List:
Aspirin (Ecotrin*) 81 MG TABLET.DR   1 TAB PO DAILY chest pain
Buprenorphine HCl/Naloxone HCl (Suboxone 8 MG-2 MG Sl Film) 8 MG-2 MG FILM   1 
STR SL BID OPIATE DEPENDENCE  (Reported)
Metoprolol Tartrate 25 MG TABLET   1 TAB PO BID HEART
 
 
Past History
 
Travel History
Traveled to [place] past 21 day No
 
Medical History
Blood Transfusion Hx: No
Neurological: NONE
EENT: NONE
Cardiovascular: NSVT
Respiratory: obstructive sleep apnea (on CPAP)
Gastrointestinal: NONE
Hepatic: NONE
Renal: NONE
Musculoskeletal: NONE
Psychiatric: NONE
Endocrine: NONE
Blood Disorders: NONE
Cancer(s): NONE
GYN/Reproductive: NONE
 
Surgical History
Surgical History: right knee surgery x 2
 
Family History
Relations & Conditions If Any:
FATHER ( young of unclear cause).
 
 
Psychosocial History
Services at Home: None
Smoking Status: Former Smoker
ETOH Use: occasional use
Illicit Drug Use: denies illicit drug use
 
Functional Ability
ADLs
Independent: dressing, eating, toileting, bathing. 
Ambulation: independent
IADLs
Independent: shopping, housework, finances, food prep, telephone, transportation
, medication admin. 
 
Employment History
Employment: Employed
 
Exam & Diagnostic Data
Vital Signs and I&O
Vital Signs
 
 
Date Time Temp Pulse Resp B/P B/P Pulse O2 O2 Flow FiO2
 
     Mean Ox Delivery Rate 
 
2031  80  120/80     
 
08/ 1400 98.1 59 20 108/60  95 Room Air  
 
 1210 98.1 53 18 108/60  94 Room Air  
 
 1103 97.8 65 18 128/57  98   
 
/ 1003 97.6 53 18 105/56  983   
 
08/ 0815 98.1 55 18 112/63  98   
 
/ 0721 98.6 92 18 123/81  98 Room Air  
 
 
 Intake & Output
 
 
  1600  0800  0000  1600  0800  0000
 
Intake Total 400     
 
Output Total      
 
Balance 400     
 
       
 
Intake, Oral 400     
 
Patient 298 lb 230 lb    
 
Weight      
 
Weight  Reported by Patient    
 
Measurement      
 
Method      
 
 
 
Physical Exam:
General: WD/obese male in NAD; alert and oriented x 3
HEENT: NC/AT, PERRL, EOMI
Neck: no JVD, no carotid bruit
Heart: RRR w/o murmur
Lungs: clear bilaterally
Abdomen: soft, obese, NT, +ve bowel sounds
Extremities: no edema
 
Assessment/Plan
Assessment/Plan
* This patient has recurrent episodes of palpitations with increased heart rate.
His Holter monitor shows frequent PVC's including two short runs of asymptomatic
NSVT. This is in the setting of a normal EF. It is possible that suboxone is 
contributing to this since it is associated with prolonged QT interval 
tachycardia and bradycardia. This drug should be stopped. A cardiac MRI is also 
recommended which can be done as an outpatient. I do not think this patient is 
having angina or unstable angina and he has ruled out for an MI but, in 
considedration of recurrent bouts of chest pain I recommend a treadmill nuclear 
stress test.
* For now, I would continue Metoprolol at 25mg BID and an aspirin at 81mg daily.
 
 
 
 
Consult Acknowledgment
- Thank you for your consult request.

## 2018-08-03 VITALS — SYSTOLIC BLOOD PRESSURE: 116 MMHG | DIASTOLIC BLOOD PRESSURE: 72 MMHG

## 2018-08-03 VITALS — SYSTOLIC BLOOD PRESSURE: 90 MMHG | DIASTOLIC BLOOD PRESSURE: 60 MMHG

## 2018-08-03 VITALS — SYSTOLIC BLOOD PRESSURE: 114 MMHG | DIASTOLIC BLOOD PRESSURE: 72 MMHG

## 2018-08-03 VITALS — SYSTOLIC BLOOD PRESSURE: 120 MMHG | DIASTOLIC BLOOD PRESSURE: 70 MMHG

## 2018-08-03 LAB
ABSOLUTE GRANULOCYTE CT: 3.9 /CUMM (ref 1.4–6.5)
BASOPHILS # BLD: 0.1 /CUMM (ref 0–0.2)
BASOPHILS NFR BLD: 0.7 % (ref 0–2)
EOSINOPHIL # BLD: 0.2 /CUMM (ref 0–0.7)
EOSINOPHIL NFR BLD: 3 % (ref 0–5)
ERYTHROCYTE [DISTWIDTH] IN BLOOD BY AUTOMATED COUNT: 15.2 % (ref 11.5–14.5)
GRANULOCYTES NFR BLD: 50.1 % (ref 42.2–75.2)
HCT VFR BLD CALC: 36.2 % (ref 42–52)
LYMPHOCYTES # BLD: 3.2 /CUMM (ref 1.2–3.4)
MCH RBC QN AUTO: 27.8 PG (ref 27–31)
MCHC RBC AUTO-ENTMCNC: 33.2 G/DL (ref 33–37)
MCV RBC AUTO: 83.7 FL (ref 80–94)
MONOCYTES # BLD: 0.4 /CUMM (ref 0.1–0.6)
PLATELET # BLD: 306 /CUMM (ref 130–400)
PMV BLD AUTO: 7.8 FL (ref 7.4–10.4)
RED BLOOD CELL CT: 4.33 /CUMM (ref 4.7–6.1)
WBC # BLD AUTO: 7.8 /CUMM (ref 4.8–10.8)

## 2018-08-03 NOTE — PN- HOUSESTAFF
Sebastian Sher 18 0538:
Subjective
Follow-up For:
Ventricular tachycardia
Complaints: no complaints
Subjective:
Patient is doing well.  Did not have any palpitations overnight.  Slept well.  
Said that the new CPAP mask was leaking, otherwise no issues.  Said he is 
willing to consider tapering Suboxone.
 
Review of Systems
Constitutional:
Reports: see HPI. 
 
Objective
Last 24 Hrs of Vital Signs/I&O
 Vital Signs
 
 
Date Time Temp Pulse Resp B/P B/P Pulse O2 O2 Flow FiO2
 
     Mean Ox Delivery Rate 
 
 0651 97.9 52 18 116/72  95 Room Air  
 
 0000       CPAP  
 
 2217 98.3 62 18 120/80  94 Room Air  
 
 2031  80  120/80     
 
 1400 98.1 59 20 108/60  95 Room Air  
 
 
 Intake & Output
 
 
  1600  0800  0000
 
Intake Total  120 120
 
Output Total   
 
Balance  120 120
 
    
 
Intake, Oral  120 120
 
Patient 298 lb  
 
Weight   
 
 
 
 
Physical Exam
General Appearance: Alert, Oriented X3, Cooperative, No Acute Distress
Cardiovascular: Regular Rate, No Murmurs
Lungs: Clear to Auscultation, Normal Air Movement
Abdomen: Normal Bowel Sounds, Soft, No Tenderness, No Hepatospenomegaly, No 
Masses
Neurological: Normal Speech, Strength at 5/5 X4 Ext, Normal Tone, Sensation 
Intact
Extremities: No Clubbing, No Cyanosis, No Edema, Normal Pulses, No Tenderness/
Swelling
Current Medications:
 Current Medications
 
 
  Sig/Namrata Start time  Last
 
Medication Dose Route Stop Time Status Admin
 
Acetaminophen 1,000 MG DAILY PRN  1130 AC 
 
  IV   
 
Acetaminophen 650 MG Q6P PRN  1045 AC 
 
  PO   
 
Aspirin Buffered 81 MG DAILY  0900 AC 
 
  PO   1244
 
Atorvastatin Calcium 40 MG 1700 /02 1700 AC 
 
  PO   1658
 
Buprenorphine/ 1 TAB BID  2100 AC 
 
Naloxone  SL   1244
 
Heparin Sodium  5,000 UNIT Q8  1400 AC 
 
(Porcine)  SC   0557
 
Magnesium Chloride 64 MG BID  1455 AC 
 
  PO   1244
 
Metoprolol Tartrate 25 MG BID  2100 AC 
 
  PO   
 
Metoprolol Tartrate 25 MG BID  2100 DC 
 
  PO  2300  2031
 
Nitroglycerin 1 GM Q6 PRN  1045 AC 
 
  TOP   
 
 
 
 
Last 24 Hrs of Lab/Gabe Results
Last 24 Hrs of Labs/Mics:
 Laboratory Tests
 
18 0608:
Anion Gap 7, Estimated GFR > 60, BUN/Creatinine Ratio 21.4, CBC w Diff NO MAN 
DIFF REQ, RBC 4.33  L, MCV 83.7, MCH 27.8, MCHC 33.2, RDW 15.2  H, MPV 7.8, Gran
% 50.1, Lymphocytes % 41.0, Monocytes % 5.2, Eosinophils % 3.0, Basophils % 0.7,
Absolute Granulocytes 3.9, Absolute Lymphocytes 3.2, Absolute Monocytes 0.4, 
Absolute Eosinophils 0.2, Absolute Basophils 0.1
 
18:
Troponin I < 0.01
 
 
Assessment/Plan
Assessment:
Mr. Rangel is a 37-year-old male patient with GIOVANNY, opiate dependence on Suboxone
for 9 years, multiple recent hospital evaluations for possible arrhythmias 2018 to 2018, came to the hospital today complaining of chest tightness, 
palpitations and associated lightheadedness despite metoprolol 25 mg.  Patient 
was scheduled for stress test and outpatient cardiac MRI.
-Follow-up nuclear stress test
-Follow-up cardiac consult
-Continue metoprolol 25 mg twice daily and aspirin 81 mg once a day
-Continue Suboxone therapy.
 
GIOVANNY
Continue nocturnal CPAP
 
 
Problem List:
 1. Ventricular tachycardia
 
 2. Palpitations
 
Pain Ratin
Pain Location:
-
Pain Goal: Remain pain free
Pain Plan:
-
Tomorrow's Labs & Rationales:
none
Consulting Request:
   Consulting Specialty: Cardiology
   Consulting Physician:
Dr. Barcenas
   Reason for Consult: LISA Wakefield MD,Karon 18 1033:
Attending MD Review Statement
 
Attending Statement
Attending MD Statement: examined this patient, discuss w/resident/PA/NP, agreed 
w/resident/PA/NP, reviewed EMR data (avail), discussed with nursing, discussed 
with case mgmt, reviewed images
Attending Assessment/Plan:
Patient feels okay and overnight did not rule in for an MI.  The Holter monitor 
revealed 4000 PVCs and multiple SVTs as well.  He is going to have his nuclear 
stress test today and will follow-up after that.

## 2018-08-03 NOTE — PATIENT DISCHARGE INSTRUCTIONS
Discharge Instructions
 
General Discharge Information
You were seen/treated for:
Palpitation and on Holter monitoring having ventricular tachycardia.
Special Instructions:
Please follow-up with your cardiologist within a week of discharge.
Please come back to emergency department or call your cardiologist if you 
started having palpitation or shortness of breath.
Please discuss with your psychiatrist, to taper your Suboxone.
Please follow up with Dr Jensen on Monday,for possible ablation.
 
Diet
Recommended Diet: Heart Healthy
 
Acute Coronary Syndrome
 
Inclusion Criteria
At DC or during hospital stay patient has or had the following:
ACS DIAGNOSIS No
 
Discharge Core Measures
Meds if any: Prescribed or Continued at Discharge
Meds if any: NOT Prescribed or Continued at Discharge
 
Congestive Heart Failure
 
Inclusion Criteria
At DC or during hospital stay patient has or had the following:
CHF DIAGNOSIS No
 
Discharge Core Measures
Meds if any: Prescribed or Continued at Discharge
Meds if any: NOT Prescribed or Continued at Discharge
 
Cerebrovascular accident
 
Inclusion Criteria
At DC or during hospital stay patient has or had the following:
CVA/TIA Diagnosis No
 
Discharge Core Measures
Meds if any: Prescribed or Continued at Discharge
Meds if any: NOT Prescribed or Continued at Discharge
 
Venous thromboembolism
 
Inclusion Criteria
VTE Diagnosis No
VTE Type NONE
VTE Confirmed by (Test) NONE
 
Discharge Core Measures
- Per Current guidelines, there needs to be overlap
- treatment for the first 5 days of Warfarin therapy.
- If discharged on Warfarin prior to 5 days of
- overlap therapy, the patient will need to be
- assessed for post discharge needs including
- *Post discharge parental anticoagulation
- *Warfarin and/or parental anticoagulation education
- *Follow up date to check INR post discharge
At least 5 days overlap therapy as Inpatient No
Meds if any: Prescribed or Continued at Discharge
Note: Overlap Therapy is Warfarin and Anticoagulant
Meds if any: NOT Prescribed or Continued at Discharge

## 2018-08-03 NOTE — PN- CARDIOLOGY
Subjective
Subjective:
* Patient feels well. He had sustained ventricular tachycardia during his stress
test without chest pain of significant lightheadedness.
 
Objective
Vital Signs and I&Os
Vital Signs
 
 
Date Time Temp Pulse Resp B/P B/P Pulse O2 O2 Flow FiO2
 
     Mean Ox Delivery Rate 
 
 1408 97.5 77 18 120/70  94 Room Air  
 
 0651 97.9 52 18 116/72  95 Room Air  
 
 0000       CPAP  
 
 2217 98.3 62 18 120/80  94 Room Air  
 
 2031  80  120/80     
 
 
 Intake & Output
 
 
  1600  0800  0000  1600  08 0000
 
Intake Total 400 120 120 400  
 
Output Total 650     
 
Balance -250 120 120 400  
 
       
 
Intake, Oral 400 120 120 400  
 
Output, Urine 650     
 
Patient 298 lb   298 lb 230 lb 
 
Weight      
 
Weight     Reported by Patient 
 
Measurement      
 
Method      
 
 
 
Physical Exam:
General: WD/obese male in NAD; alert and oriented x 3
HEENT: NC/AT, PERRL, EOMI
Neck: no JVD, no carotid bruit
Heart: RRR w/o murmur
Lungs: clear bilaterally
Abdomen: soft, obese, NT, +ve bowel sounds
Extremities: no edema
 
Assessment/Plan
Assessment/Plan
* This patient has recurrent episodes of palpitations with increased heart rate.
His Holter monitor shows frequent PVC's including two short runs of asymptomatic
NSVT. During his stress test today he experienced sustained VT. He was set up 
for a two day stress test protocol with his resting images to be done on Monday.
This patient cannot be discharged to home until his stress test is read and the 
absence of ischemia is confirmed. 
* The patient will see Dr. Rosales Jensen on Monday in anticipation of a VT 
ablation. He does have a normal EF and in this setting is at less risk of sudden
death. He does have an uncle that  suddenly. It is possible that suboxone is
contributing to this since it is associated with prolonged QT interval 
tachycardia and bradycardia. This drug should be stopped. A cardiac MRI is also 
recommended which can be done as an outpatient. 
* For now, I would continue Metoprolol at 37.5 mg BID and an aspirin at 81mg 
daily.
 
Continue telemetry? Yes

## 2018-08-04 VITALS — SYSTOLIC BLOOD PRESSURE: 94 MMHG | DIASTOLIC BLOOD PRESSURE: 52 MMHG

## 2018-08-04 VITALS — SYSTOLIC BLOOD PRESSURE: 104 MMHG | DIASTOLIC BLOOD PRESSURE: 56 MMHG

## 2018-08-04 VITALS — SYSTOLIC BLOOD PRESSURE: 98 MMHG | DIASTOLIC BLOOD PRESSURE: 50 MMHG

## 2018-08-04 NOTE — PN- CARDIOLOGY
Subjective
Subjective:
Feeling well.  He is noted to have occasional brief episodes of nonsustained 
ventricular tachycardia on telemetry.  He is noted to have sinus bradycardia 
while asleep with heart rates dropping into the 30s. No chest pain.  No 
shortness of breath.  No nausea or vomiting.  No lightheadedness or dizziness.
 
Objective
Vital Signs and I&Os
Vital Signs
 
 
Date Time Temp Pulse Resp B/P B/P Pulse O2 O2 Flow FiO2
 
     Mean Ox Delivery Rate 
 
08/04 0700 97.7 53 16 94/52  94   
 
08/04 0000      96 CPAP  
 
08/03 2302  46  90/60     
 
08/03 2227 98.4 58 16 114/72  92 Room Air  
 
08/03 2005  64  110/70     
 
08/03 1408 97.5 77 18 120/70  94 Room Air  
 
 
 Intake & Output
 
 
 08/04 1600 08/04 0800 08/04 0000 08/03 1600 08/03 0800 08/03 0000
 
Intake Total  400 450 400 120 120
 
Output Total    650  
 
Balance  400 450 -250 120 120
 
       
 
Intake, Oral  400 450 400 120 120
 
Number   0   
 
Bowel      
 
Movements      
 
Output, Urine    650  
 
Patient   291 lb 298 lb  
 
Weight      
 
Weight   Bed scale   
 
Measurement      
 
Method      
 
 
 
Physical Exam:
Gen: NAD
HEENT: normal
Lungs: clear to auscultation, normal resp. effort
Heart: RRR, S1, S2, no murmurs
Abdomen: Soft, nontender, no masses
Extremities: No clubbing, cyanosis, or edema.
Neuro: Alert and oriented x 3, cranial nerves intact
Current Medications:
 Current Medications
 
 
  Sig/Namrata Start time  Last
 
Medication Dose Route Stop Time Status Admin
 
Acetaminophen 1,000 MG DAILY PRN 08/02 1130 AC 
 
  IV   
 
Acetaminophen 650 MG Q6P PRN 08/02 1045 AC 
 
  PO   
 
Aspirin Buffered 81 MG DAILY 08/03 0900 AC 08/04
 
  PO   0800
 
Atorvastatin Calcium 40 MG 1700 08/02 1700 AC 08/03
 
  PO   1614
 
Buprenorphine/ 1 TAB BID 08/02 2100 AC 08/04
 
Naloxone  SL   0800
 
Docusate Sodium 100 MG DAILY AS NEEDED PRN 08/03 1815 AC 
 
  PO   
 
Heparin Sodium  5,000 UNIT Q8 08/02 1400 AC 08/04
 
(Porcine)  SC   0545
 
Magnesium Chloride 64 MG BID 08/02 1455 AC 08/04
 
  PO   0801
 
Metoprolol Tartrate 25 MG BID 08/03 2100 DC 
 
  PO   
 
Metoprolol Tartrate 37.5 MG BID 08/03 2100 AC 08/03
 
  PO   2005
 
Nitroglycerin 1 GM Q6 PRN 08/02 1045 AC 
 
  TOP   
 
Patient Medication  1 ED ONE ONE 08/03 1500 DC 
 
Teaching  ED 08/03 1501  
 
 
 
 
Results
Last 48 Hrs of Labs/Mics:
 Laboratory Tests
 
08/04/18 0620:
Anion Gap 7, Estimated GFR > 60, BUN/Creatinine Ratio 18.8, Phosphorus 4.5, 
Magnesium 2.0
 
08/03/18 0608:
Anion Gap 7, Estimated GFR > 60, BUN/Creatinine Ratio 21.4, CBC w Diff NO MAN 
DIFF REQ, RBC 4.33  L, MCV 83.7, MCH 27.8, MCHC 33.2, RDW 15.2  H, MPV 7.8, Gran
% 50.1, Lymphocytes % 41.0, Monocytes % 5.2, Eosinophils % 3.0, Basophils % 0.7,
Absolute Granulocytes 3.9, Absolute Lymphocytes 3.2, Absolute Monocytes 0.4, 
Absolute Eosinophils 0.2, Absolute Basophils 0.1
 
08/02/18 2015:
Troponin I < 0.01
 
08/02/18 1342:
Troponin I < 0.01
 
Recent Imaging Studies:
Chest x-ray:
No acute cardiopulmonary process.
 
 
Assessment/Plan
Assessment/Plan
Assessment:
1.  Sustained ventricular tachycardia seen on stress test with nonsustained 
ventricular tachycardia on telemetry
2.  Nuclear stress test results pending
3.  Normal ejection fracture
4.  Sinus bradycardia on
 
Plan:
* Decrease metoprolol to 25 mg p.o. twice daily given sinus bradycardia 
* Continue to monitor on telemetry
Continue telemetry? Yes

## 2018-08-04 NOTE — PN- HOUSESTAFF
Keesha RODRIGUEZ,Keshia 18 0458:
Subjective
Follow-up For:
Nonsustained ventricular tachycardia
Complaints: no complaints
Subjective:
Patient seen and examined at bedside.  No overnight events.  He slept well 
overnight.  Denies chest pain, palpitation, dizziness, short of breath.
 
Review of Systems
Constitutional:
Reports: no symptoms. 
 
Objective
Last 24 Hrs of Vital Signs/I&O
 Vital Signs
 
 
Date Time Temp Pulse Resp B/P B/P Pulse O2 O2 Flow FiO2
 
     Mean Ox Delivery Rate 
 
 0000      96 CPAP  
 
 2302  46  90/60     
 
 2227 98.4 58 16 114/72  92 Room Air  
 
 2005  64  110/70     
 
 1408 97.5 77 18 120/70  94 Room Air  
 
 0651 97.9 52 18 116/72  95 Room Air  
 
 
 Intake & Output
 
 
  0800  0000  1600
 
Intake Total  450 400
 
Output Total   650
 
Balance  450 -250
 
    
 
Intake, Oral  450 400
 
Number  0 
 
Bowel   
 
Movements   
 
Output, Urine   650
 
Patient  291 lb 298 lb
 
Weight   
 
Weight  Bed scale 
 
Measurement   
 
Method   
 
 
 
 
Physical Exam
General Appearance: Alert, Oriented X3, Cooperative, No Acute Distress
Cardiovascular: Regular Rate, Normal S1, Normal S2, No Murmurs
Lungs: Clear to Auscultation
Abdomen: Soft, No Tenderness, No Hepatospenomegaly
Neurological: Normal Speech, Strength at 5/5 X4 Ext, Normal Tone, Sensation 
Intact, Cranial Nerves 3-12 NL
Extremities: No Cyanosis, No Edema, Normal Pulses, No Tenderness/Swelling
Current Medications:
 Current Medications
 
 
  Sig/Namrata Start time  Last
 
Medication Dose Route Stop Time Status Admin
 
Acetaminophen 1,000 MG DAILY PRN  1130 AC 
 
  IV   
 
Acetaminophen 650 MG Q6P PRN  1045 AC 
 
  PO   
 
Aspirin Buffered 81 MG DAILY  0900 AC 
 
  PO   1244
 
Atorvastatin Calcium 40 MG 1700  1700 AC 
 
  PO   1614
 
Buprenorphine/ 1 TAB BID  2100 AC 
 
Naloxone  SL   2005
 
Docusate Sodium 100 MG DAILY AS NEEDED PRN  1815 AC 
 
  PO   
 
Heparin Sodium  5,000 UNIT Q8  1400 AC 
 
(Porcine)  SC   2156
 
Magnesium Chloride 64 MG BID  1455 AC 
 
  PO   2005
 
Metoprolol Tartrate 25 MG BID  2100 DC 
 
  PO   
 
Metoprolol Tartrate 37.5 MG BID  2100 AC 
 
  PO   
 
Nitroglycerin 1 GM Q6 PRN  1045  
 
  TOP   
 
Patient Medication  1 ED ONE ONE  1500 DC 
 
Teaching  ED  1501  
 
 
 
 
Last 24 Hrs of Lab/Gabe Results
Last 24 Hrs of Labs/Mics:
 Laboratory Tests
 
18 0608:
Anion Gap 7, Estimated GFR > 60, BUN/Creatinine Ratio 21.4, CBC w Diff NO MAN 
DIFF REQ, RBC 4.33  L, MCV 83.7, MCH 27.8, MCHC 33.2, RDW 15.2  H, MPV 7.8, Gran
% 50.1, Lymphocytes % 41.0, Monocytes % 5.2, Eosinophils % 3.0, Basophils % 0.7,
Absolute Granulocytes 3.9, Absolute Lymphocytes 3.2, Absolute Monocytes 0.4, 
Absolute Eosinophils 0.2, Absolute Basophils 0.1
 
 
Assessment/Plan
Assessment:
Mr. Rangel is a 37-year-old male patient with GIOVANNY, opiate dependence on Suboxone
for 9 years, multiple recent hospital evaluations for possible arrhythmias 2018 to 2018, came to the hospital this visit with complaints of  chest 
tightness, palpitations and associated lightheadedness despite metoprolol 25 mg.
 Patient was scheduled for stress test and outpatient cardiac MRI.
 
Tachycardia
 
* Continue telemetry monitoring-vitals every shift
* Patient did not get his nuclear stress test.  During the time of stress test 
yesterday patient developed sustained VT and hence it was withheld.
* We will continue metoprolol 25 and aspirin 81 daily.
* Patient was seen by cardiology who suggested to discontinue Suboxone.  We will
discuss this with the primary team this morning.
* Patient will see Dr. Jensen,  in view of possible ablation of ventricular 
tachycardia.
 
 
GIOVANNY
Continue nocturnal CPAP
 
Code-full code
Diet-heart healthy diet
Problem List:
 1. Ventricular tachycardia
 
Pain Ratin
Pain Location:
None
Pain Goal: Remain pain free
Pain Plan:
Tylenol
Tomorrow's Labs & Rationales:
bep
Consulting Request:
   Consulting Specialty: Cardiology
   Consulting Physician:
Dr. Barcenas
   Reason for Consult: LISA Oseguera MD,[facility] 18 1213:
Attending MD Review Statement
 
Attending Statement
Attending MD Statement: examined this patient, discuss w/resident/PA/NP, agreed 
w/resident/PA/NP, reviewed EMR data (avail), discussed with nursing, discussed 
with case mgmt, reviewed images, amended to note
Attending Assessment/Plan:
Patient seen and examined, denies any complaints.  On telemetry monitor he had 
few episodes of bradycardia.  Patient had episodes of V. tach during the stress 
test first part last week.  Second part of stress test is scheduled for Monday.
 
Vital Signs
 
 
Date Time Temp Pulse Resp B/P B/P Pulse O2 O2 Flow FiO2
 
     Mean Ox Delivery Rate 
 
 1147  53  94/52     
 
 0700 97.7 53 16 94/52  94   
 
 0000      96 CPAP  
 
 2302  46  90/60     
 
 2227 98.4 58 16 114/72  92 Room Air  
 
 2005  64  110/70     
 
 1408 97.5 77 18 120/70  94 Room Air  
 
 
on exam:
aox3, nad. 
cv; s1,s2, rrr
resp; clear
abd; soft, nt, bs+
ext; no edema
 
 Laboratory Tests
 
 
 
 
 0620
 
Chemistry 
 
  Sodium (137 - 145 mmol/L) 138
 
  Potassium (3.5 - 5.1 mmol/L) 4.5
 
  Chloride (98 - 107 mmol/L) 103
 
  Carbon Dioxide (22 - 30 mmol/L) 28
 
  Anion Gap (5 - 16) 7
 
  BUN (9 - 20 mg/dL) 15
 
  Creatinine (0.7 - 1.2 mg/dL) 0.8
 
  Estimated GFR (>60 ml/min) > 60
 
  BUN/Creatinine Ratio (7 - 25 %) 18.8
 
  Phosphorus (2.5 - 4.5 mg/dL) 4.5
 
  Magnesium (1.6 - 2.3 mg/dL) 2.0
 
 
A/P; 37-year-old male patient with GIOVANNY, opiate dependence on Suboxone for long 
time, episodes of arrhythmias admitted to the hospital with chest tightness, 
palpitations and lightheadedness.  Patient underwent first part of the stress 
test yesterday and had some episodes of V. tach.  Second part of the stress test
is scheduled for Monday.
Appreciate cardiology input.  Beta-blocker dose has been reduced secondary to 
his bradycardia.
Please make sure his electrolytes are okay.  Continue the rest of the 
management.  DVT px: Heparin subcu. 
Patient remained on telemetry monitor/.

## 2018-08-05 VITALS — SYSTOLIC BLOOD PRESSURE: 100 MMHG | DIASTOLIC BLOOD PRESSURE: 56 MMHG

## 2018-08-05 VITALS — SYSTOLIC BLOOD PRESSURE: 102 MMHG | DIASTOLIC BLOOD PRESSURE: 62 MMHG

## 2018-08-05 VITALS — SYSTOLIC BLOOD PRESSURE: 112 MMHG | DIASTOLIC BLOOD PRESSURE: 60 MMHG

## 2018-08-05 NOTE — PN- HOUSESTAFF
Sebastian Sher 18 0628:
Subjective
Follow-up For:
Ventricular tachycardianonsustained
Complaints: no complaints
Tele-Events Since Last Visit:
Some PVCs
Subjective:
Patient is doing well .no complaints.
 
Review of Systems
Constitutional:
Reports: see HPI. 
 
Objective
Last 24 Hrs of Vital Signs/I&O
 Vital Signs
 
 
Date Time Temp Pulse Resp B/P B/P Pulse O2 O2 Flow FiO2
 
     Mean Ox Delivery Rate 
 
 1509 97.7 55 20 112/60  94   
 
 0808  64  102/62     
 
 0634 97.5 74 20 102/62  94 Room Air  
 
 2154 98.2 67 20 98/50  94 Room Air  
 
2010  74  112/70     
 
 
 Intake & Output
 
 
  1600  0800  0000
 
Intake Total 400 220 440
 
Output Total 650  
 
Balance -250 220 440
 
    
 
Intake, Oral 400 220 440
 
Output, Urine 650  
 
Patient   304 lb
 
Weight   
 
 
 
 
Physical Exam
General Appearance: Alert, Oriented X3, Cooperative, No Acute Distress
Cardiovascular: Regular Rate, No Murmurs
Lungs: Clear to Auscultation, Normal Air Movement
Abdomen: Normal Bowel Sounds, Soft, No Tenderness, No Hepatospenomegaly, No 
Masses
Neurological: Normal Speech, Strength at 5/5 X4 Ext, Normal Tone, Sensation 
Intact
Extremities: No Clubbing, No Cyanosis, No Edema, Normal Pulses, No Tenderness/
Swelling
Current Medications:
 Current Medications
 
 
  Sig/Namrata Start time  Last
 
Medication Dose Route Stop Time Status Admin
 
Acetaminophen 1,000 MG DAILY PRN  1130 AC 
 
  IV   
 
Acetaminophen 650 MG Q6P PRN  1045 AC 
 
  PO   
 
Aspirin Buffered 81 MG DAILY  0900 AC 
 
  PO   0807
 
Atorvastatin Calcium 40 MG 1700 / 1700 AC 
 
  PO   1610
 
Buprenorphine/ 1 TAB BID  2100 AC 
 
Naloxone  SL   0809
 
Docusate Sodium 100 MG DAILY AS NEEDED PRN  1815 AC 
 
  PO   
 
Heparin Sodium  5,000 UNIT Q8  1400 AC 
 
(Porcine)  SC   1359
 
Magnesium Chloride 64 MG BID  2100 CAN 
 
  PO   
 
Magnesium Chloride 64 MG BID  1455 AC 
 
  PO   0807
 
Metoprolol Tartrate 25 MG BID  2100 AC 
 
  PO   0808
 
Nitroglycerin 1 GM Q6 PRN  1045 AC 
 
  TOP   
 
 
 
 
Last 24 Hrs of Lab/Gabe Results
Last 24 Hrs of Labs/Mics:
 Laboratory Tests
 
18 0604:
Phosphorus 4.5, Magnesium 1.8, Lyme Disease Antibody Pending
 
 
Assessment/Plan
Assessment:
Mr. Rangel is a 37-year-old male patient with GIOVANNY, opiate dependence on Suboxone
for 9 years, multiple recent hospital evaluations for possible arrhythmias 2018 to 2018, came to the hospital today complaining of chest tightness, 
palpitations and associated lightheadedness despite metoprolol 25 mg.  
- Normal Mg and PO4 levels
- Patient had stress test and metoprolol dose was increased to 37.5mg which 
caused sinus bradycardia.
-Decrease metoprolol to 25 mg twice daily  and aspirin 81 mg once a day, 
atorvastatin 40 mg
- Pending nuclear stress test result.  N.p.o. for tomorrow nuclear stress test.
-EP consult tomorrow with Dr. Jensen for ventricular tachycardia.
- Continue Suboxone therapy.
-Continue monitoring on telemetry
 
GIOVANNY
Continue nocturnal CPAP
 
Problem List:
 1. Ventricular tachycardia
 
Pain Ratin
Pain Location:
None
Pain Goal: Remain pain free
Pain Plan:
None
Tomorrow's Labs & Rationales:
None
Consulting Request:
   Consulting Specialty: Cardiology
   Consulting Physician:
Dr. Barcenas
   Reason for Consult: LISA Oseguera MD,[facility] 18 1200:
Attending MD Review Statement
 
Attending Statement
Attending MD Statement: examined this patient, discuss w/resident/PA/NP, agreed 
w/resident/PA/NP, reviewed EMR data (avail), discussed with nursing, reviewed 
images, amended to note
Attending Assessment/Plan:
Patient seen and examined, denies any complaints.  On telemetry monitor he had 
PVCs and sinus bradycardia with rate in 40s.
 
Vital Signs
 
 
Date Time Temp Pulse Resp B/P B/P Pulse O2 O2 Flow FiO2
 
     Mean Ox Delivery Rate 
 
 0808  64  102/62     
 
 0634 97.5 74 20 102/62  94 Room Air  
 
 2154 98.2 67 20 98/50  94 Room Air  
 
 2010  74  112/70     
 
 1434 98.3 72 20 104/56  94 Room Air  
 
 1239  68  112/64     
 
 
on exam:
aox3, nad. 
cv; s1,s2, rrr
resp; clear
abd; soft, nt, bs+
ext; no edema
 
 Laboratory Tests
 
 
 604
 
Chemistry 
 
  Potassium (3.5 - 5.1 mmol/L) 4.2
 
  Phosphorus (2.5 - 4.5 mg/dL) 4.5
 
  Magnesium (1.6 - 2.3 mg/dL) 1.8
 
Serology 
 
  Lyme Disease Antibody Pending
 
 
A/P; 37-year-old male patient with GIOVANNY, opiate dependence on Suboxone for long 
time, episodes of arrhythmias admitted to the hospital with chest tightness, 
palpitations and lightheadedness.  Patient underwent first part of the stress 
test yesterday and had some episodes of V. tach.  Second part of the stress test
is scheduled for Monday.  Cardiologist also recommend electrophysiology 
evaluation with Dr. Jensen.
Beta-blocker dose has been reduced secondary to his bradycardia as of yesterday.
Continue the rest of the management.  DVT px: Heparin subcu. 
Patient to remain on telemetry monitor.

## 2018-08-05 NOTE — PN- CARDIOLOGY
Subjective
Subjective:
Feeling well.  Occasional brief palpitations.  No chest pain.  No shortness of 
breath.  No diaphoresis.  He continues to have short runs of nonsustained 
ventricular tachycardia on telemetry.
 
Objective
Vital Signs and I&Os
Vital Signs
 
 
Date Time Temp Pulse Resp B/P B/P Pulse O2 O2 Flow FiO2
 
     Mean Ox Delivery Rate 
 
08/05 0808  64  102/62     
 
08/05 0634 97.5 74 20 102/62  94 Room Air  
 
08/04 2154 98.2 67 20 98/50  94 Room Air  
 
08/04 2010  74  112/70     
 
08/04 1434 98.3 72 20 104/56  94 Room Air  
 
08/04 1239  68  112/64     
 
08/04 1147  53  94/52     
 
 
 Intake & Output
 
 
 08/05 1600 08/05 0800 08/05 0000 08/04 1600 08/04 0800 08/04 0000
 
Intake Total  220 440 510 400 450
 
Output Total      
 
Balance  220 440 510 400 450
 
       
 
Intake, IV    10  
 
Intake, Oral  220 440 500 400 450
 
Number    1  0
 
Bowel      
 
Movements      
 
Patient   304 lb   291 lb
 
Weight      
 
Weight      Bed scale
 
Measurement      
 
Method      
 
 
 
Physical Exam:
Gen: NAD
HEENT: normal
Lungs: clear to auscultation, normal resp. effort
Heart: RRR, S1, S2, no murmurs
Abdomen: Soft, nontender, no masses
Extremities: No clubbing, cyanosis, or edema.
Neuro: Alert and oriented x 3, cranial nerves intact
Current Medications:
 Current Medications
 
 
  Sig/Namrata Start time  Last
 
Medication Dose Route Stop Time Status Admin
 
Acetaminophen 1,000 MG DAILY PRN 08/02 1130 AC 
 
  IV   
 
Acetaminophen 650 MG Q6P PRN 08/02 1045 AC 
 
  PO   
 
Aspirin Buffered 81 MG DAILY 08/03 0900 AC 08/05
 
  PO   0807
 
Atorvastatin Calcium 40 MG 1700 08/02 1700 AC 08/04
 
  PO   1614
 
Buprenorphine/ 1 TAB BID 08/02 2100 AC 08/05
 
Naloxone  SL   0809
 
Docusate Sodium 100 MG DAILY AS NEEDED PRN 08/03 1815 AC 
 
  PO   
 
Heparin Sodium  5,000 UNIT Q8 08/02 1400 AC 08/05
 
(Porcine)  SC   0556
 
Magnesium Chloride 64 MG BID 08/02 1455 AC 08/05
 
  PO   0807
 
Metoprolol Tartrate 25 MG BID 08/04 2100 AC 08/05
 
  PO   0808
 
Metoprolol Tartrate 25 MG ONCE ONE 08/04 1230 DC 08/04
 
  PO 08/04 1231  1239
 
Metoprolol Tartrate 37.5 MG BID 08/03 2100 DC 08/03
 
  PO   2005
 
Nitroglycerin 1 GM Q6 PRN 08/02 1045 AC 
 
  TOP   
 
 
 
 
Results
Last 48 Hrs of Labs/Mics:
 Laboratory Tests
 
08/05/18 0604:
Phosphorus 4.5, Magnesium 1.8, Lyme Disease Antibody Pending
 
08/04/18 0620:
Anion Gap 7, Estimated GFR > 60, BUN/Creatinine Ratio 18.8, Phosphorus 4.5, 
Magnesium 2.0
 
 
Assessment/Plan
Assessment/Plan
Assessment:
1.  Sustained ventricular tachycardia seen on stress test with nonsustained 
ventricular tachycardia on telemetry
2.  Nuclear stress test results pending
3.  Normal ejection fracture
4.  Sinus bradycardia on
 
Plan:
* Continue metoprolol
* Continue to monitor on telemetry
* Nuclear stress test to be completed tomorrow
* EP consult tomorrow with Dr. Jensen for ventricular tachycardia
Continue telemetry? Yes

## 2018-08-06 VITALS — SYSTOLIC BLOOD PRESSURE: 102 MMHG | DIASTOLIC BLOOD PRESSURE: 52 MMHG

## 2018-08-06 VITALS — DIASTOLIC BLOOD PRESSURE: 58 MMHG | SYSTOLIC BLOOD PRESSURE: 104 MMHG

## 2018-08-06 NOTE — PN- HOUSESTAFF
Sebastian Sher 18 0555:
Subjective
Follow-up For:
NSVT
Complaints: pain scale (0-10) (dizziness, palpitations)
Subjective:
Patient had berif episode of symptomatic NSVT yesterday evening with 10 beats 
and felt dizzy and palpitations. OTHER WIse no complains
 
Review of Systems
Constitutional:
Reports: see HPI. 
 
Objective
Last 24 Hrs of Vital Signs/I&O
 Vital Signs
 
 
Date Time Temp Pulse Resp B/P B/P Pulse O2 O2 Flow FiO2
 
     Mean Ox Delivery Rate 
 
 0000       CPAP  
 
 2218 97.8 59 18 100/56  97 Room Air  
 
 2030  65  140/70     
 
 1509 97.7 55 20 112/60  94   
 
 0808  64  102/62     
 
 0634 97.5 74 20 102/62  94 Room Air  
 
 
 Intake & Output
 
 
  0800  0000  1600
 
Intake Total  120 400
 
Output Total   650
 
Balance  120 -250
 
    
 
Intake, Oral  120 400
 
Output, Urine   650
 
Patient  305 lb 
 
Weight   
 
Weight  Bed scale 
 
Measurement   
 
Method   
 
 
 
 
Physical Exam
General Appearance: Alert, Oriented X3, Cooperative, No Acute Distress, Patient 
is obsese
Cardiovascular: Regular Rate, No Murmurs
Lungs: Clear to Auscultation, Normal Air Movement
Abdomen: Normal Bowel Sounds, Soft, No Tenderness, No Hepatospenomegaly, No 
Masses
Neurological: Normal Speech, Strength at 5/5 X4 Ext, Normal Tone, Sensation 
Intact
Extremities: No Clubbing, No Cyanosis, No Edema, Normal Pulses, No Tenderness/
Swelling
Current Medications:
 Current Medications
 
 
  Sig/Namrata Start time  Last
 
Medication Dose Route Stop Time Status Admin
 
Acetaminophen 1,000 MG DAILY PRN  1130 AC 
 
  IV   
 
Acetaminophen 650 MG Q6P PRN  1045 AC 
 
  PO   
 
Aspirin Buffered 81 MG DAILY  0900 AC 
 
  PO   0807
 
Atorvastatin Calcium 40 MG 1700  1700 AC 
 
  PO   1610
 
Buprenorphine/ 1 TAB BID  2100 AC 
 
Naloxone  SL   2106
 
Docusate Sodium 100 MG DAILY AS NEEDED PRN  1815 AC 
 
  PO   
 
Heparin Sodium  5,000 UNIT Q8  1400 AC 
 
(Porcine)  SC   2106
 
Magnesium Chloride 64 MG BID  2100 CAN 
 
  PO   
 
Magnesium Chloride 64 MG BID  1455 AC 
 
  PO   2106
 
Metoprolol Tartrate 37.5 MG BID  2100 AC 
 
  PO   2030
 
Metoprolol Tartrate 25 MG BID  2100 DC 
 
  PO   0808
 
Nitroglycerin 1 GM Q6 PRN  1045 AC 
 
  TOP   
 
 
 
 
Last 24 Hrs of Lab/Gabe Results
Last 24 Hrs of Labs/Mics:
 Laboratory Tests
 
18 0604:
Phosphorus 4.5, Magnesium 1.8, Lyme Disease Antibody Pending
 
 
Assessment/Plan
Assessment:
NSVT- 
Patient had a brief episode of NSVT yesterday evening , 2018 6.40 pm of 10 
beats, we increased the dose of metoprolol to 37.5 mg  as discussed with Dr. Barcenas
Monitor heart rate, Tele
Nuclear stress test done on friday, results awaited- negatiive
Patient is scheduled for Nuclear stress test today and is NPO
WIll discuss the case with DR. Jensen today for EP.
Continue Metoprolol, Eliquis, 
 
GIOVANNY- 
Patient is using the CPAP 
Problem List:
 1. Ventricular tachycardia
 
Pain Ratin
Pain Location:
none
 
Pain Goal: Remain pain free
Pain Plan:
none
Tomorrow's Labs & Rationales:
none
Consulting Request:
   Consulting Specialty: Cardiology
   Consulting Physician:
Dr. Barcenas
   Reason for Consult: LISA Wakefield MD,[facility] 18 1003:
Attending MD Review Statement
 
Attending Statement
Attending MD Statement: examined this patient, discuss w/resident/PA/NP, agreed 
w/resident/PA/NP, reviewed EMR data (avail), discussed with nursing, discussed 
with case mgmt, reviewed images
Attending Assessment/Plan:
Pt feels okay.  He is scheduled to have the second part of his stress test 
today.  In the first part he had sustained VT.  Dr. Jensen will also see him as 
if he has inducible VT he will need an ablation.  He is on a beta-blocker and 
aspirin.

## 2018-08-06 NOTE — PN- CARDIOLOGY
Subjective
Subjective:
* No complaints.
* NSVT noted over the weekend.
* stress testing showed some reverse redistribution with a fixed inferior defect
without ischemia.
 
Objective
Vital Signs and I&Os
Vital Signs
 
 
Date Time Temp Pulse Resp B/P B/P Pulse O2 O2 Flow FiO2
 
     Mean Ox Delivery Rate 
 
 0924  81  102/52     
 
 0640 97.9 55 18 102/52  98 CPAP  
 
 0000       CPAP  
 
 2218 97.8 59 18 100/56  97 Room Air  
 
 2030  65  140/70     
 
 1509 97.7 55 20 112/60  94   
 
 
 Intake & Output
 
 
  1600  08 0000  1600  0800  0000
 
Intake Total  120 120 400 220 440
 
Output Total    650  
 
Balance  120 120 -250 220 440
 
       
 
Intake, Oral  120 120 400 220 440
 
Output, Urine    650  
 
Patient   305 lb   304 lb
 
Weight      
 
Weight   Bed scale   
 
Measurement      
 
Method      
 
 
 
Physical Exam:
General: WD/obese male in NAD; alert and oriented x 3
HEENT: NC/AT, PERRL, EOMI
Neck: no JVD, no carotid bruit
Heart: RRR w/o murmur
Lungs: clear bilaterally
Abdomen: soft, obese, NT, +ve bowel sounds
Extremities: no edema
 
Assessment/Plan
Assessment/Plan
* This patient has recurrent episodes of palpitations with increased heart rate.
His Holter monitor and telemetry show frequent PVC's including short runs of 
asymptomatic NSVT. During his stress test today he experienced sustained VT. No 
ischemia was found on nuclear imaging. In consideration of his normal EF he is 
at low risk for sudden cardiac death although some risk does exist. His 
situation was discussed with Dr. Rosales Jensen who feels that the patient will be 
safe for discharge with follow up in the office on Monday. The plan is for a VT 
ablation. I discussed the risks and benefits of the procedure as well as 
discharge today verses transfer to [facility] today and he prefers to go 
home and follow up in a week. Discharge to home.
* The patient will see Dr. Rosales Jensen on Monday in anticipation of a VT 
ablation. He does have a normal EF and in this setting is at less risk of sudden
death. He does have an uncle that  suddenly. It is possible that suboxone is
contributing to this since it is associated with prolonged QT interval 
tachycardia and bradycardia. This drug should be stopped. A cardiac MRI is also 
recommended which can be done as an outpatient. 
* For now, I would continue Metoprolol at 37.5 mg BID and an aspirin at 81mg 
daily.
Continue telemetry? No

## 2018-08-06 NOTE — NUCLEAR MEDICINE REPORT
EXERCISE STRESS AND RESTING SPECT MYOCARDIAL PERFUSION IMAGING STUDY WITH
GATED SPECT IMAGES:
 
CLINICAL INDICATION: Abnormal EKG
 
PROCEDURE: Regional myocardial perfusion was assessed using a 2 day protocol.
Stress images were obtained on 08/03/2018 following the intravenous
administration of 47.2 mCi Tc 99m Myoview. Stress was performed using the
standard Deejay protocol, with the patient reaching a peak heart rate of 118%
maximal predicted heart rate. Rest images were obtained 08/06/2018 following
the intravenous administration of 49 mCi Technetium 99m Myoview.
 
Single photon emission tomographic (SPECT) images were obtained. SPECT images
were acquired in a 64 x 64 matrix of 64 projections over 180 degrees. These
were reconstructed into standard short axis, horizontal and vertical long
axis cardiac projections.
 
FINDINGS: Mildly decreased activity in the inferior wall of the left
ventricular myocardium on stress images appears worse on the resting images.
There is no evidence of stress-induced myocardial ischemia. No definite
evidence of infarct.
 
The stress images were obtained using a gated SPECT technique, which permits
visualization of wall motion and calculation of the left ventricular ejection
fraction. There is mild diffuse hypokinesia.
 
The calculated left ventricular ejection fraction is 55% on the stress study.
 
 
IMPRESSION:
1. No definite evidence of stress-induced myocardial ischemia. Decreased
activity in the inferior wall appears greater on the resting images. However,
no definite evidence of infarct.
2. Mild diffuse hypokinesia. Ejection fraction 55%.

## 2023-07-23 ENCOUNTER — EMERGENCY (EMERGENCY)
Facility: HOSPITAL | Age: 43
LOS: 1 days | Discharge: LEFT BEFORE DISCHARGE | End: 2023-07-23
Attending: EMERGENCY MEDICINE | Admitting: EMERGENCY MEDICINE
Payer: COMMERCIAL

## 2023-07-23 VITALS
RESPIRATION RATE: 14 BRPM | HEART RATE: 72 BPM | OXYGEN SATURATION: 99 % | HEIGHT: 72 IN | DIASTOLIC BLOOD PRESSURE: 79 MMHG | SYSTOLIC BLOOD PRESSURE: 138 MMHG | TEMPERATURE: 98 F | WEIGHT: 250 LBS

## 2023-07-23 VITALS
SYSTOLIC BLOOD PRESSURE: 124 MMHG | DIASTOLIC BLOOD PRESSURE: 69 MMHG | OXYGEN SATURATION: 97 % | HEART RATE: 63 BPM | RESPIRATION RATE: 14 BRPM

## 2023-07-23 LAB
ALBUMIN SERPL ELPH-MCNC: 3.8 G/DL — SIGNIFICANT CHANGE UP (ref 3.3–5)
ALP SERPL-CCNC: 75 U/L — SIGNIFICANT CHANGE UP (ref 40–120)
ALT FLD-CCNC: 23 U/L — SIGNIFICANT CHANGE UP (ref 12–78)
ANION GAP SERPL CALC-SCNC: 6 MMOL/L — SIGNIFICANT CHANGE UP (ref 5–17)
AST SERPL-CCNC: 23 U/L — SIGNIFICANT CHANGE UP (ref 15–37)
BASOPHILS # BLD AUTO: 0.06 K/UL — SIGNIFICANT CHANGE UP (ref 0–0.2)
BASOPHILS NFR BLD AUTO: 0.8 % — SIGNIFICANT CHANGE UP (ref 0–2)
BILIRUB SERPL-MCNC: 0.4 MG/DL — SIGNIFICANT CHANGE UP (ref 0.2–1.2)
BUN SERPL-MCNC: 18 MG/DL — SIGNIFICANT CHANGE UP (ref 7–23)
CALCIUM SERPL-MCNC: 8.9 MG/DL — SIGNIFICANT CHANGE UP (ref 8.5–10.1)
CHLORIDE SERPL-SCNC: 107 MMOL/L — SIGNIFICANT CHANGE UP (ref 96–108)
CO2 SERPL-SCNC: 28 MMOL/L — SIGNIFICANT CHANGE UP (ref 22–31)
CREAT SERPL-MCNC: 1.2 MG/DL — SIGNIFICANT CHANGE UP (ref 0.5–1.3)
D DIMER BLD IA.RAPID-MCNC: <150 NG/ML DDU — SIGNIFICANT CHANGE UP
EGFR: 76 ML/MIN/1.73M2 — SIGNIFICANT CHANGE UP
EOSINOPHIL # BLD AUTO: 0.27 K/UL — SIGNIFICANT CHANGE UP (ref 0–0.5)
EOSINOPHIL NFR BLD AUTO: 3.7 % — SIGNIFICANT CHANGE UP (ref 0–6)
GLUCOSE SERPL-MCNC: 96 MG/DL — SIGNIFICANT CHANGE UP (ref 70–99)
HCT VFR BLD CALC: 38.7 % — LOW (ref 39–50)
HGB BLD-MCNC: 12.3 G/DL — LOW (ref 13–17)
IMM GRANULOCYTES NFR BLD AUTO: 0.3 % — SIGNIFICANT CHANGE UP (ref 0–0.9)
LIDOCAIN IGE QN: 54 U/L — LOW (ref 73–393)
LYMPHOCYTES # BLD AUTO: 2.93 K/UL — SIGNIFICANT CHANGE UP (ref 1–3.3)
LYMPHOCYTES # BLD AUTO: 39.8 % — SIGNIFICANT CHANGE UP (ref 13–44)
MCHC RBC-ENTMCNC: 28.3 PG — SIGNIFICANT CHANGE UP (ref 27–34)
MCHC RBC-ENTMCNC: 31.8 GM/DL — LOW (ref 32–36)
MCV RBC AUTO: 89.2 FL — SIGNIFICANT CHANGE UP (ref 80–100)
MONOCYTES # BLD AUTO: 0.48 K/UL — SIGNIFICANT CHANGE UP (ref 0–0.9)
MONOCYTES NFR BLD AUTO: 6.5 % — SIGNIFICANT CHANGE UP (ref 2–14)
NEUTROPHILS # BLD AUTO: 3.6 K/UL — SIGNIFICANT CHANGE UP (ref 1.8–7.4)
NEUTROPHILS NFR BLD AUTO: 48.9 % — SIGNIFICANT CHANGE UP (ref 43–77)
NRBC # BLD: 0 /100 WBCS — SIGNIFICANT CHANGE UP (ref 0–0)
NT-PROBNP SERPL-SCNC: 77 PG/ML — SIGNIFICANT CHANGE UP (ref 0–125)
PLATELET # BLD AUTO: 278 K/UL — SIGNIFICANT CHANGE UP (ref 150–400)
POTASSIUM SERPL-MCNC: 3.6 MMOL/L — SIGNIFICANT CHANGE UP (ref 3.5–5.3)
POTASSIUM SERPL-SCNC: 3.6 MMOL/L — SIGNIFICANT CHANGE UP (ref 3.5–5.3)
PROT SERPL-MCNC: 7.2 G/DL — SIGNIFICANT CHANGE UP (ref 6–8.3)
RBC # BLD: 4.34 M/UL — SIGNIFICANT CHANGE UP (ref 4.2–5.8)
RBC # FLD: 13.6 % — SIGNIFICANT CHANGE UP (ref 10.3–14.5)
SODIUM SERPL-SCNC: 141 MMOL/L — SIGNIFICANT CHANGE UP (ref 135–145)
TROPONIN I, HIGH SENSITIVITY RESULT: 25.7 NG/L — SIGNIFICANT CHANGE UP
WBC # BLD: 7.36 K/UL — SIGNIFICANT CHANGE UP (ref 3.8–10.5)
WBC # FLD AUTO: 7.36 K/UL — SIGNIFICANT CHANGE UP (ref 3.8–10.5)

## 2023-07-23 PROCEDURE — 85379 FIBRIN DEGRADATION QUANT: CPT

## 2023-07-23 PROCEDURE — 71045 X-RAY EXAM CHEST 1 VIEW: CPT

## 2023-07-23 PROCEDURE — 85025 COMPLETE CBC W/AUTO DIFF WBC: CPT

## 2023-07-23 PROCEDURE — 83880 ASSAY OF NATRIURETIC PEPTIDE: CPT

## 2023-07-23 PROCEDURE — 84484 ASSAY OF TROPONIN QUANT: CPT

## 2023-07-23 PROCEDURE — 36415 COLL VENOUS BLD VENIPUNCTURE: CPT

## 2023-07-23 PROCEDURE — 71045 X-RAY EXAM CHEST 1 VIEW: CPT | Mod: 26

## 2023-07-23 PROCEDURE — 99285 EMERGENCY DEPT VISIT HI MDM: CPT

## 2023-07-23 PROCEDURE — 80053 COMPREHEN METABOLIC PANEL: CPT

## 2023-07-23 PROCEDURE — 99285 EMERGENCY DEPT VISIT HI MDM: CPT | Mod: 25

## 2023-07-23 PROCEDURE — 93005 ELECTROCARDIOGRAM TRACING: CPT

## 2023-07-23 PROCEDURE — 93010 ELECTROCARDIOGRAM REPORT: CPT

## 2023-07-23 PROCEDURE — 83690 ASSAY OF LIPASE: CPT

## 2023-07-23 RX ORDER — METOPROLOL TARTRATE 50 MG
1 TABLET ORAL
Refills: 0 | DISCHARGE

## 2023-07-23 RX ORDER — BUPRENORPHINE AND NALOXONE 2; .5 MG/1; MG/1
2 TABLET SUBLINGUAL
Refills: 0 | DISCHARGE

## 2023-07-23 NOTE — ED ADULT NURSE NOTE - NSFALLUNIVINTERV_ED_ALL_ED
Bed/Stretcher in lowest position, wheels locked, appropriate side rails in place/Call bell, personal items and telephone in reach/Instruct patient to call for assistance before getting out of bed/chair/stretcher/Non-slip footwear applied when patient is off stretcher/[place] to call system/Physically safe environment - no spills, clutter or unnecessary equipment/Purposeful proactive rounding/Room/bathroom lighting operational, light cord in reach

## 2023-07-23 NOTE — ED PROVIDER NOTE - NSFOLLOWUPINSTRUCTIONS_ED_ALL_ED_FT
Return to the ER if you have recurrent chest  pain or follow up with your doctor in 24 hrs.     Chest Pain    Chest pain can be caused by many different conditions which may or may not be dangerous. Causes include heartburn, lung infections, heart attack, blood clot in lungs, skin infections, strain or damage to muscle, cartilage, or bones, etc. In addition to a history and physical examination, an electrocardiogram (ECG) or other lab tests may have been performed to determine the cause of your chest pain. Follow up with your primary care provider or with a cardiologist as instructed.     SEEK IMMEDIATE MEDICAL CARE IF YOU HAVE ANY OF THE FOLLOWING SYMPTOMS: worsening chest pain, coughing up blood, unexplained back/neck/jaw pain, severe abdominal pain, dizziness or lightheadedness, fainting, shortness of breath, sweaty or clammy skin, vomiting, or racing heart beat. These symptoms may represent a serious problem that is an emergency. Do not wait to see if the symptoms will go away. Get medical help right away. Call 911 and do not drive yourself to the hospital.

## 2023-07-23 NOTE — ED PROVIDER NOTE - OBJECTIVE STATEMENT
42-year-old male with history of SVT status post ablation is presenting with a midsternal chest discomfort feels like a sore muscle as per patient that he experienced around 6 PM while he was driving.  He states it lasted for few hours nonradiating and then resolved spontaneously.  He took a nap he woke up and noticed that he is still is feeling the same discomfort but this episode lasted for about 20 minutes and resolved prior to arrival.  Denies diaphoresis shortness of breath palpitations associated with the symptoms.  Patient has no symptoms at this time.  Did not take any medications prior to arrival.  Patient states he runs and works out does not get any exertional symptoms.  States he had a similar episode of pain a few years ago for which she had a cardiac cath and was told it was normal.

## 2023-07-23 NOTE — ED PROVIDER NOTE - PROGRESS NOTE DETAILS
Labs unremarkable  Pt informed of results  Advised rpt trop at 2am. Pt does not wish to wait for rpt labs. States he feels fine at this time and will come back if his pain reoccurs. Risks of missing a potential ACS explained to pt and importance of completing the workup was d/w pt. Pt verbalized he understood the risks but he still has to leave due to work reasons. Pt signed AMA form.     The patient has decided to leave against medical advice (AMA).  I have made reasonable attempts to explain to the patient that leaving prior to completion of work up and treatment may result in recurrent or worsening of symptoms, severe permanent disability, pain and suffering, harm, injury, and/or death.  I have explained the risks, benefits, and alternatives to treatment as well as the attendant risks of refusing treatment at this time.  The patient has demonstrated comprehension and verbalizes understanding of these risks.  The patient has been told that they must return to the ER immediately for persistent or recurring symptoms, worsening symptoms, or any concerning symptoms.  The patient has also been informed that they may return to the ER immediately at any time if they change their mind and wish to resume care. The patient has been given the opportunity to ask questions and have them fully answered.

## 2023-07-23 NOTE — ED PROVIDER NOTE - CLINICAL SUMMARY MEDICAL DECISION MAKING FREE TEXT BOX
42-year-old gentleman is presenting with chest discomfort last episode was 20 minutes prior to arrival resolved at this time.  Will evaluate for ACS, PE, pneumothorax, GERD, musculoskeletal.  We will get labs, EKG, chest x-ray.  No pain at this time.  We will repeat troponins in 3 hours.  Will reassess.

## 2023-07-23 NOTE — ED ADULT NURSE NOTE - OBJECTIVE STATEMENT
Pt to ED states he had episode of chest discomfort at 1800 tonight which resolved. Pt states he woke from sleep with chest discomfort, came to ED. Pt a/o x 4, skin w/d, resps nonlabored, bbs cta. Pt ambulates with steady gait, states pain has fully resolved at this time.

## 2023-07-23 NOTE — ED PROVIDER NOTE - PATIENT PORTAL LINK FT
You can access the FollowMyHealth Patient Portal offered by [facility] by registering at the following website: [email or web address]. By joining [employer]’s FollowMyHealth portal, you will also be able to view your health information using other applications (apps) compatible with our system.

## 2023-07-24 NOTE — ED ADULT NURSE REASSESSMENT NOTE - NS ED NURSE REASSESS COMMENT FT1
Pt states he does not want to wait for repeat troponin, Dr. He discussed consequences with patient and patient STEVEN